# Patient Record
Sex: MALE | Race: WHITE | NOT HISPANIC OR LATINO | Employment: FULL TIME | URBAN - METROPOLITAN AREA
[De-identification: names, ages, dates, MRNs, and addresses within clinical notes are randomized per-mention and may not be internally consistent; named-entity substitution may affect disease eponyms.]

---

## 2017-01-25 ENCOUNTER — OFFICE VISIT (OUTPATIENT)
Dept: URGENT CARE | Facility: URGENT CARE | Age: 62
End: 2017-01-25
Payer: COMMERCIAL

## 2017-01-25 VITALS
BODY MASS INDEX: 25.55 KG/M2 | OXYGEN SATURATION: 97 % | WEIGHT: 199.06 LBS | DIASTOLIC BLOOD PRESSURE: 82 MMHG | SYSTOLIC BLOOD PRESSURE: 127 MMHG | HEART RATE: 77 BPM | TEMPERATURE: 96.9 F

## 2017-01-25 DIAGNOSIS — J06.9 VIRAL URI: Primary | ICD-10-CM

## 2017-01-25 PROCEDURE — 99213 OFFICE O/P EST LOW 20 MIN: CPT | Performed by: FAMILY MEDICINE

## 2017-01-25 NOTE — MR AVS SNAPSHOT
"              After Visit Summary   2017    Ronak Malave    MRN: [id number]           Patient Information     Date Of Birth          1955        Visit Information        Provider Department      2017 2:15 PM Mendoza Ashley, DO [facility]        Today's Diagnoses     Viral URI    -  1        Follow-ups after your visit        Who to contact     If you have questions or need follow up information about today's clinic visit or your schedule please contact [facility] directly at [phone].  Normal or non-critical lab and imaging results will be communicated to you by [employer], letter or phone within 4 business days after the clinic has received the results. If you do not hear from us within 7 days, please contact the clinic through [employer] or phone. If you have a critical or abnormal lab result, we will notify you by phone as soon as possible.  Submit refill requests through [employer] or call your pharmacy and they will forward the refill request to us. Please allow 3 business days for your refill to be completed.          Additional Information About Your Visit        MyChart Information     [employer] lets you send messages to your doctor, view your test results, renew your prescriptions, schedule appointments and more. To sign up, go to [email or web address] . Click on \"Log in\" on the left side of the screen, which will take you to the Welcome page. Then click on \"Sign up Now\" on the right side of the page.     You will be asked to enter the access code listed below, as well as some personal information. Please follow the directions to create your username and password.     Your access code is: [email or web address]  Expires: 2017  2:38 PM     Your access code will  in 90 days. If you need help or a new code, please call your [place] clinic or [phone].        Care EveryWhere ID     This is your Care EveryWhere ID. This could be used " by other organizations to access your [place] medical records  [id number]        Your Vitals Were     Pulse Temperature Pulse Oximetry             77 96.9  F (36.1  C) (Oral) 97%          Blood Pressure from Last 3 Encounters:   01/25/17 127/82   01/23/14 122/70   01/22/14 116/74    Weight from Last 3 Encounters:   01/25/17 199 lb 1 oz (90.294 kg)   01/22/14 189 lb 9.5 oz (86 kg)   01/22/14 191 lb (86.637 kg)              Today, you had the following     No orders found for display       Primary Care Provider Office Phone # Fax #    Tracey Malcolm [phone]       [facility] [place] [address]  [facility] [address]        Thank you!     Thank you for choosing [facility]  for your care. Our goal is always to provide you with excellent care. Hearing back from our patients is one way we can continue to improve our services. Please take a few minutes to complete the written survey that you may receive in the mail after your visit with us. Thank you!             Your Updated Medication List - Protect others around you: Learn how to safely use, store and throw away your medicines at www.disposemymeds.org.          This list is accurate as of: 1/25/17  2:38 PM.  Always use your most recent med list.                   Brand Name Dispense Instructions for use    aspirin 81 MG tablet      Take 81 mg by mouth daily       FINASTERIDE PO      Take 1 mg by mouth daily       glucosamine-chondroitin 500-400 MG Caps per capsule      Take 1 capsule by mouth daily       spironolactone 100 MG tablet    ALDACTONE     Take 50 mg by mouth daily Per pt: takes 1/2 of 100mg tab/day.       warfarin 5 MG tablet    COUMADIN    30 tablet    Take 1 tablet (5 mg) by mouth daily

## 2017-01-25 NOTE — NURSING NOTE
"Chief Complaint   Patient presents with     Cough     peresistent cough, running stuffy nose and fatigue for over two weeks     Initial /82 mmHg  Pulse 77  Temp(Src) 96.9  F (36.1  C) (Oral)  Wt 199 lb 1 oz (90.294 kg)  SpO2 97% Estimated body mass index is 25.55 kg/(m^2) as calculated from the following:    Height as of 1/22/14: 6' 2\" (1.88 m).    Weight as of this encounter: 199 lb 1 oz (90.294 kg)..  bp completed using cuff size regular  LYNDA Art MA    "

## 2017-06-12 ENCOUNTER — OFFICE VISIT (OUTPATIENT)
Dept: URGENT CARE | Facility: URGENT CARE | Age: 62
End: 2017-06-12
Payer: COMMERCIAL

## 2017-06-12 VITALS
OXYGEN SATURATION: 97 % | BODY MASS INDEX: 24.33 KG/M2 | HEART RATE: 71 BPM | DIASTOLIC BLOOD PRESSURE: 72 MMHG | SYSTOLIC BLOOD PRESSURE: 118 MMHG | WEIGHT: 189.5 LBS

## 2017-06-12 DIAGNOSIS — K62.5 RECTAL BLEEDING: Primary | ICD-10-CM

## 2017-06-12 PROCEDURE — 99213 OFFICE O/P EST LOW 20 MIN: CPT | Performed by: FAMILY MEDICINE

## 2017-06-12 NOTE — PROGRESS NOTES
Chief Complaint   Patient presents with     Rectal Problem     rectal bleeding - no pain - 1st noticed it in the last hour - blood was on the underware      SUBJECTIVE:  Ronak Malave, a 62 year old female scheduled an appointment to discuss the following issues:  Rectal bleeding     Pt is here with symptoms of rectal bleeding which she noticed today an hr back while she was wiping herself up  Pt did have some upset stomach last week and then noticed a swelling in the rear end   It was tender and hard to touch   Pt denies any fever or chills or any change in bowel movements .      No past medical history on file.   Past Surgical History:   Procedure Laterality Date     BREAST SURGERY       ORTHOPEDIC SURGERY          Social History     Social History     Marital status: Single     Spouse name: N/A     Number of children: N/A     Years of education: N/A     Occupational History     Not on file.     Social History Main Topics     Smoking status: Never Smoker     Smokeless tobacco: Never Used     Alcohol use 0.6 oz/week     0 Standard drinks or equivalent, 1 Glasses of wine per week     Drug use: No     Sexual activity: Not Currently     Partners: Female      Comment: transwoman     Other Topics Concern     Not on file     Social History Narrative        Current Outpatient Prescriptions   Medication Sig Dispense Refill     FINASTERIDE PO Take 1 mg by mouth daily       aspirin 81 MG tablet Take 81 mg by mouth daily       spironolactone (ALDACTONE) 100 MG tablet Take 50 mg by mouth daily Per pt: takes 1/2 of 100mg tab/day.       glucosamine-chondroitin 500-400 MG CAPS Take 1 capsule by mouth daily       warfarin (COUMADIN) 5 MG tablet Take 1 tablet (5 mg) by mouth daily (Patient not taking: Reported on 6/12/2017) 30 tablet 0       Health Maintenance   Topic Date Due     TETANUS IMMUNIZATION (SYSTEM ASSIGNED)  03/05/1973     ADVANCE DIRECTIVE PLANNING Q5 YRS  03/05/1973     HEPATITIS C SCREENING  03/05/1973     PAP  SCREENING Q3 YR (SYSTEM ASSIGNED)  03/05/1976     MAMMO SCREEN Q2 YR (SYSTEM ASSIGNED)  03/05/1995     LIPID SCREEN Q5 YR FEMALE (SYSTEM ASSIGNED)  03/05/2000     COLON CANCER SCREEN (SYSTEM ASSIGNED)  03/05/2005     INFLUENZA VACCINE (SYSTEM ASSIGNED)  09/01/2017        ROS:  CONSTITUTIONAL:no fever, no chills or sweats, no excessive fatigue, no significant change in weight  CV: neg   RESP -neg  GI:  Neg   NEURO: neg   MSK - neg   Skin - neg   Pyschiatry-neg     OBJECTIVE:  /72  Pulse 71  Wt 189 lb 8 oz (86 kg)  SpO2 97%  Breastfeeding? No  BMI 24.33 kg/m2      EXAM:  GENERAL APPEARANCE: healthy, alert and no distress  EYES: EOMI,  PERRL  HENT: ear canals and TM's normal and nose and mouth without ulcers or lesions  RESP: lungs clear to auscultation - no rales, rhonchi or wheezes  CV: regular rates and rhythm, normal S1 S2, no S3 or S4 and no murmur, click or rub -  ABDOMEN:  soft, nontender, no HSM or masses and bowel sounds normal  Rectal exam:thrombosed hemorrhoid noted which on touching had associated bleeding with 3-4 tiny clots noted too through an opening   The swelling decreased in size too   PSYCH: mentation appears normal and affect normal/bright        ASSESSMENT/PLAN:  Ronak was seen today for rectal problem.    Diagnoses and all orders for this visit:    Rectal bleeding    thrombosed hemorrhoid was cleaned off clots   With some bleeding   Pt tolerated procedure well   Discussed in details about diet involving high fibre         Follow up if  symptoms fail to improve or worsens   Pt understood and agreed with plan           Milagros Case MD

## 2017-06-12 NOTE — MR AVS SNAPSHOT
"              After Visit Summary   2017    Ronak Malave    MRN: [id number]           Patient Information     Date Of Birth          1955        Visit Information        Provider Department      2017 5:30 PM Milagros Case MD [facility]        Today's Diagnoses     Rectal bleeding    -  1       Follow-ups after your visit        Who to contact     If you have questions or need follow up information about today's clinic visit or your schedule please contact [facility] directly at [phone].  Normal or non-critical lab and imaging results will be communicated to you by [employer], letter or phone within 4 business days after the clinic has received the results. If you do not hear from us within 7 days, please contact the clinic through [employer] or phone. If you have a critical or abnormal lab result, we will notify you by phone as soon as possible.  Submit refill requests through [employer] or call your pharmacy and they will forward the refill request to us. Please allow 3 business days for your refill to be completed.          Additional Information About Your Visit        MyChart Information     [employer] lets you send messages to your doctor, view your test results, renew your prescriptions, schedule appointments and more. To sign up, go to [email or web address] . Click on \"Log in\" on the left side of the screen, which will take you to the Welcome page. Then click on \"Sign up Now\" on the right side of the page.     You will be asked to enter the access code listed below, as well as some personal information. Please follow the directions to create your username and password.     Your access code is: [email or web address]  Expires: 9/10/2017  6:17 PM     Your access code will  in 90 days. If you need help or a new code, please call your [place] clinic or [phone].        Care EveryWhere ID     This is your Care EveryWhere ID. This could be " used by other organizations to access your [place] medical records  [id number]        Your Vitals Were     Pulse Pulse Oximetry Breastfeeding? BMI (Body Mass Index)          71 97% No 24.33 kg/m2         Blood Pressure from Last 3 Encounters:   06/12/17 118/72   01/25/17 127/82   01/23/14 122/70    Weight from Last 3 Encounters:   06/12/17 189 lb 8 oz (86 kg)   01/25/17 199 lb 1 oz (90.3 kg)   01/22/14 189 lb 9.5 oz (86 kg)              Today, you had the following     No orders found for display       Primary Care Provider Office Phone # Fax #    Tracey Malcolm [phone]       [facility] [place] [address]  [facility] [address]        Thank you!     Thank you for choosing [facility]  for your care. Our goal is always to provide you with excellent care. Hearing back from our patients is one way we can continue to improve our services. Please take a few minutes to complete the written survey that you may receive in the mail after your visit with us. Thank you!             Your Updated Medication List - Protect others around you: Learn how to safely use, store and throw away your medicines at www.disposemymeds.org.          This list is accurate as of: 6/12/17  6:17 PM.  Always use your most recent med list.                   Brand Name Dispense Instructions for use    aspirin 81 MG tablet      Take 81 mg by mouth daily       FINASTERIDE PO      Take 1 mg by mouth daily       glucosamine-chondroitin 500-400 MG Caps per capsule      Take 1 capsule by mouth daily       spironolactone 100 MG tablet    ALDACTONE     Take 50 mg by mouth daily Per pt: takes 1/2 of 100mg tab/day.       warfarin 5 MG tablet    COUMADIN    30 tablet    Take 1 tablet (5 mg) by mouth daily

## 2017-06-12 NOTE — NURSING NOTE
"Chief Complaint   Patient presents with     Rectal Problem     rectal bleeding - no pain - 1st noticed it in the last hour - blood was on the underware    Ha not had anal sex in 8 years.     Initial /72  Pulse 71  Wt 86 kg (189 lb 8 oz)  SpO2 97%  Breastfeeding? No  BMI 24.33 kg/m2 Estimated body mass index is 24.33 kg/(m^2) as calculated from the following:    Height as of 1/22/14: 1.88 m (6' 2\").    Weight as of this encounter: 86 kg (189 lb 8 oz).  Medication Reconciliation: complete    "

## 2018-03-17 ENCOUNTER — HEALTH MAINTENANCE LETTER (OUTPATIENT)
Age: 63
End: 2018-03-17

## 2018-07-16 ENCOUNTER — APPOINTMENT (OUTPATIENT)
Dept: CARDIOLOGY | Facility: CLINIC | Age: 63
DRG: 282 | End: 2018-07-16
Attending: EMERGENCY MEDICINE

## 2018-07-16 ENCOUNTER — APPOINTMENT (OUTPATIENT)
Dept: CARDIOLOGY | Facility: CLINIC | Age: 63
DRG: 282 | End: 2018-07-16
Attending: STUDENT IN AN ORGANIZED HEALTH CARE EDUCATION/TRAINING PROGRAM

## 2018-07-16 ENCOUNTER — HOSPITAL ENCOUNTER (INPATIENT)
Facility: CLINIC | Age: 63
LOS: 2 days | Discharge: HOME | DRG: 282 | End: 2018-07-18
Attending: EMERGENCY MEDICINE | Admitting: INTERNAL MEDICINE

## 2018-07-16 DIAGNOSIS — I21.29 ST ELEVATION MYOCARDIAL INFARCTION (STEMI) INVOLVING OTHER CORONARY ARTERY (H): ICD-10-CM

## 2018-07-16 DIAGNOSIS — D58.2 ELEVATED HEMOGLOBIN (H): ICD-10-CM

## 2018-07-16 DIAGNOSIS — E78.5 HYPERLIPIDEMIA LDL GOAL <70: Primary | ICD-10-CM

## 2018-07-16 DIAGNOSIS — I21.3 STEMI (ST ELEVATION MYOCARDIAL INFARCTION) (H): ICD-10-CM

## 2018-07-16 LAB
ANION GAP SERPL CALCULATED.3IONS-SCNC: 10 MMOL/L (ref 3–14)
APTT PPP: 24 SEC (ref 22–37)
BASOPHILS # BLD AUTO: 0 10E9/L (ref 0–0.2)
BASOPHILS NFR BLD AUTO: 0.6 %
BUN SERPL-MCNC: 16 MG/DL (ref 7–30)
CALCIUM SERPL-MCNC: 8.5 MG/DL (ref 8.5–10.1)
CHLORIDE SERPL-SCNC: 104 MMOL/L (ref 94–109)
CO2 SERPL-SCNC: 25 MMOL/L (ref 20–32)
CREAT SERPL-MCNC: 0.96 MG/DL (ref 0.52–1.04)
DIFFERENTIAL METHOD BLD: ABNORMAL
EOSINOPHIL # BLD AUTO: 0.2 10E9/L (ref 0–0.7)
EOSINOPHIL NFR BLD AUTO: 3.1 %
ERYTHROCYTE [DISTWIDTH] IN BLOOD BY AUTOMATED COUNT: 13.7 % (ref 10–15)
GFR SERPL CREATININE-BSD FRML MDRD: 59 ML/MIN/1.7M2
GLUCOSE SERPL-MCNC: 118 MG/DL (ref 70–99)
HCT VFR BLD AUTO: 49.5 % (ref 35–47)
HGB BLD-MCNC: 16.8 G/DL (ref 11.7–15.7)
IMM GRANULOCYTES # BLD: 0 10E9/L (ref 0–0.4)
IMM GRANULOCYTES NFR BLD: 0.4 %
INR PPP: 0.88 (ref 0.86–1.14)
KCT BLD-ACNC: 188 SEC (ref 75–150)
LYMPHOCYTES # BLD AUTO: 2.3 10E9/L (ref 0.8–5.3)
LYMPHOCYTES NFR BLD AUTO: 31.7 %
MCH RBC QN AUTO: 30.2 PG (ref 26.5–33)
MCHC RBC AUTO-ENTMCNC: 33.9 G/DL (ref 31.5–36.5)
MCV RBC AUTO: 89 FL (ref 78–100)
MONOCYTES # BLD AUTO: 0.6 10E9/L (ref 0–1.3)
MONOCYTES NFR BLD AUTO: 8 %
NEUTROPHILS # BLD AUTO: 4 10E9/L (ref 1.6–8.3)
NEUTROPHILS NFR BLD AUTO: 56.2 %
NRBC # BLD AUTO: 0 10*3/UL
NRBC BLD AUTO-RTO: 0 /100
PLATELET # BLD AUTO: 271 10E9/L (ref 150–450)
POTASSIUM SERPL-SCNC: 3.4 MMOL/L (ref 3.4–5.3)
RBC # BLD AUTO: 5.56 10E12/L (ref 3.8–5.2)
SODIUM SERPL-SCNC: 139 MMOL/L (ref 133–144)
TROPONIN I SERPL-MCNC: 0.21 UG/L (ref 0–0.04)
WBC # BLD AUTO: 7.1 10E9/L (ref 4–11)

## 2018-07-16 PROCEDURE — 27210759 ZZH DEVICE INFLATION CR6

## 2018-07-16 PROCEDURE — 25000132 ZZH RX MED GY IP 250 OP 250 PS 637: Performed by: STUDENT IN AN ORGANIZED HEALTH CARE EDUCATION/TRAINING PROGRAM

## 2018-07-16 PROCEDURE — 4A023N7 MEASUREMENT OF CARDIAC SAMPLING AND PRESSURE, LEFT HEART, PERCUTANEOUS APPROACH: ICD-10-PCS | Performed by: INTERNAL MEDICINE

## 2018-07-16 PROCEDURE — 85730 THROMBOPLASTIN TIME PARTIAL: CPT | Performed by: EMERGENCY MEDICINE

## 2018-07-16 PROCEDURE — 25000128 H RX IP 250 OP 636: Performed by: STUDENT IN AN ORGANIZED HEALTH CARE EDUCATION/TRAINING PROGRAM

## 2018-07-16 PROCEDURE — C1769 GUIDE WIRE: HCPCS

## 2018-07-16 PROCEDURE — 27211089 ZZH KIT ACIST INJECTOR CR3

## 2018-07-16 PROCEDURE — 25000125 ZZHC RX 250: Performed by: STUDENT IN AN ORGANIZED HEALTH CARE EDUCATION/TRAINING PROGRAM

## 2018-07-16 PROCEDURE — 21000001 ZZH R&B HEART CARE

## 2018-07-16 PROCEDURE — 93458 L HRT ARTERY/VENTRICLE ANGIO: CPT | Mod: 26 | Performed by: INTERNAL MEDICINE

## 2018-07-16 PROCEDURE — 27210795 ZZH PAD DEFIB QUICK CR4

## 2018-07-16 PROCEDURE — 85347 COAGULATION TIME ACTIVATED: CPT

## 2018-07-16 PROCEDURE — 85610 PROTHROMBIN TIME: CPT | Performed by: EMERGENCY MEDICINE

## 2018-07-16 PROCEDURE — 99232 SBSQ HOSP IP/OBS MODERATE 35: CPT | Performed by: INTERNAL MEDICINE

## 2018-07-16 PROCEDURE — 93005 ELECTROCARDIOGRAM TRACING: CPT

## 2018-07-16 PROCEDURE — 99152 MOD SED SAME PHYS/QHP 5/>YRS: CPT | Mod: GC | Performed by: INTERNAL MEDICINE

## 2018-07-16 PROCEDURE — 80048 BASIC METABOLIC PNL TOTAL CA: CPT | Performed by: EMERGENCY MEDICINE

## 2018-07-16 PROCEDURE — 27210787 ZZH MANIFOLD CR2

## 2018-07-16 PROCEDURE — 27210914 ZZH SHEATH CR8

## 2018-07-16 PROCEDURE — 93306 TTE W/DOPPLER COMPLETE: CPT | Mod: 26 | Performed by: INTERNAL MEDICINE

## 2018-07-16 PROCEDURE — 27210856 ZZH ACCESS HEART CATH CR2

## 2018-07-16 PROCEDURE — 85025 COMPLETE CBC W/AUTO DIFF WBC: CPT | Performed by: EMERGENCY MEDICINE

## 2018-07-16 PROCEDURE — C1887 CATHETER, GUIDING: HCPCS

## 2018-07-16 PROCEDURE — 27210946 ZZH KIT HC TOTES DISP CR8

## 2018-07-16 PROCEDURE — 93306 TTE W/DOPPLER COMPLETE: CPT

## 2018-07-16 PROCEDURE — 25000132 ZZH RX MED GY IP 250 OP 250 PS 637: Performed by: INTERNAL MEDICINE

## 2018-07-16 PROCEDURE — 25000132 ZZH RX MED GY IP 250 OP 250 PS 637: Performed by: EMERGENCY MEDICINE

## 2018-07-16 PROCEDURE — 99285 EMERGENCY DEPT VISIT HI MDM: CPT

## 2018-07-16 PROCEDURE — B2111ZZ FLUOROSCOPY OF MULTIPLE CORONARY ARTERIES USING LOW OSMOLAR CONTRAST: ICD-10-PCS | Performed by: INTERNAL MEDICINE

## 2018-07-16 PROCEDURE — 84484 ASSAY OF TROPONIN QUANT: CPT | Performed by: EMERGENCY MEDICINE

## 2018-07-16 PROCEDURE — 27210998 ZZH ACCESS HEART CATH CR6

## 2018-07-16 RX ORDER — HYDRALAZINE HYDROCHLORIDE 20 MG/ML
10-20 INJECTION INTRAMUSCULAR; INTRAVENOUS
Status: DISCONTINUED | OUTPATIENT
Start: 2018-07-16 | End: 2018-07-16 | Stop reason: HOSPADM

## 2018-07-16 RX ORDER — DIPHENHYDRAMINE HYDROCHLORIDE 50 MG/ML
25-50 INJECTION INTRAMUSCULAR; INTRAVENOUS
Status: DISCONTINUED | OUTPATIENT
Start: 2018-07-16 | End: 2018-07-16 | Stop reason: HOSPADM

## 2018-07-16 RX ORDER — METOPROLOL TARTRATE 25 MG/1
25 TABLET, FILM COATED ORAL 2 TIMES DAILY
Status: DISCONTINUED | OUTPATIENT
Start: 2018-07-16 | End: 2018-07-17

## 2018-07-16 RX ORDER — METHYLPREDNISOLONE SODIUM SUCCINATE 125 MG/2ML
125 INJECTION, POWDER, LYOPHILIZED, FOR SOLUTION INTRAMUSCULAR; INTRAVENOUS
Status: DISCONTINUED | OUTPATIENT
Start: 2018-07-16 | End: 2018-07-16 | Stop reason: HOSPADM

## 2018-07-16 RX ORDER — NITROGLYCERIN 5 MG/ML
100-200 VIAL (ML) INTRAVENOUS
Status: DISCONTINUED | OUTPATIENT
Start: 2018-07-16 | End: 2018-07-16 | Stop reason: HOSPADM

## 2018-07-16 RX ORDER — LIDOCAINE 40 MG/G
CREAM TOPICAL
Status: DISCONTINUED | OUTPATIENT
Start: 2018-07-16 | End: 2018-07-18 | Stop reason: HOSPADM

## 2018-07-16 RX ORDER — NITROGLYCERIN 0.4 MG/1
0.4 TABLET SUBLINGUAL EVERY 5 MIN PRN
Status: DISCONTINUED | OUTPATIENT
Start: 2018-07-16 | End: 2018-07-16 | Stop reason: HOSPADM

## 2018-07-16 RX ORDER — ENALAPRILAT 1.25 MG/ML
1.25-2.5 INJECTION INTRAVENOUS
Status: DISCONTINUED | OUTPATIENT
Start: 2018-07-16 | End: 2018-07-16 | Stop reason: HOSPADM

## 2018-07-16 RX ORDER — NIFEDIPINE 10 MG/1
10 CAPSULE ORAL
Status: DISCONTINUED | OUTPATIENT
Start: 2018-07-16 | End: 2018-07-16 | Stop reason: HOSPADM

## 2018-07-16 RX ORDER — HYDROCODONE BITARTRATE AND ACETAMINOPHEN 5; 325 MG/1; MG/1
1-2 TABLET ORAL EVERY 4 HOURS PRN
Status: DISCONTINUED | OUTPATIENT
Start: 2018-07-16 | End: 2018-07-18 | Stop reason: HOSPADM

## 2018-07-16 RX ORDER — LORAZEPAM 2 MG/ML
.5-2 INJECTION INTRAMUSCULAR EVERY 4 HOURS PRN
Status: DISCONTINUED | OUTPATIENT
Start: 2018-07-16 | End: 2018-07-16 | Stop reason: HOSPADM

## 2018-07-16 RX ORDER — ASPIRIN 81 MG/1
81 TABLET ORAL DAILY
Status: DISCONTINUED | OUTPATIENT
Start: 2018-07-17 | End: 2018-07-18 | Stop reason: HOSPADM

## 2018-07-16 RX ORDER — MORPHINE SULFATE 2 MG/ML
1-2 INJECTION, SOLUTION INTRAMUSCULAR; INTRAVENOUS EVERY 5 MIN PRN
Status: DISCONTINUED | OUTPATIENT
Start: 2018-07-16 | End: 2018-07-16 | Stop reason: HOSPADM

## 2018-07-16 RX ORDER — SODIUM NITROPRUSSIDE 25 MG/ML
100-200 INJECTION INTRAVENOUS
Status: DISCONTINUED | OUTPATIENT
Start: 2018-07-16 | End: 2018-07-16 | Stop reason: HOSPADM

## 2018-07-16 RX ORDER — ADENOSINE 3 MG/ML
12-12000 INJECTION, SOLUTION INTRAVENOUS
Status: DISCONTINUED | OUTPATIENT
Start: 2018-07-16 | End: 2018-07-16 | Stop reason: HOSPADM

## 2018-07-16 RX ORDER — FLUMAZENIL 0.1 MG/ML
0.2 INJECTION, SOLUTION INTRAVENOUS
Status: DISCONTINUED | OUTPATIENT
Start: 2018-07-16 | End: 2018-07-16 | Stop reason: HOSPADM

## 2018-07-16 RX ORDER — NICARDIPINE HYDROCHLORIDE 2.5 MG/ML
100 INJECTION INTRAVENOUS
Status: DISCONTINUED | OUTPATIENT
Start: 2018-07-16 | End: 2018-07-16 | Stop reason: HOSPADM

## 2018-07-16 RX ORDER — FLUMAZENIL 0.1 MG/ML
0.2 INJECTION, SOLUTION INTRAVENOUS
Status: ACTIVE | OUTPATIENT
Start: 2018-07-16 | End: 2018-07-16

## 2018-07-16 RX ORDER — POTASSIUM CHLORIDE 7.45 MG/ML
10 INJECTION INTRAVENOUS
Status: DISCONTINUED | OUTPATIENT
Start: 2018-07-16 | End: 2018-07-16 | Stop reason: HOSPADM

## 2018-07-16 RX ORDER — CLOPIDOGREL BISULFATE 75 MG/1
75 TABLET ORAL
Status: DISCONTINUED | OUTPATIENT
Start: 2018-07-16 | End: 2018-07-16 | Stop reason: HOSPADM

## 2018-07-16 RX ORDER — ASPIRIN 81 MG/1
324 TABLET, CHEWABLE ORAL ONCE
Status: COMPLETED | OUTPATIENT
Start: 2018-07-16 | End: 2018-07-16

## 2018-07-16 RX ORDER — PROTAMINE SULFATE 10 MG/ML
1-5 INJECTION, SOLUTION INTRAVENOUS
Status: DISCONTINUED | OUTPATIENT
Start: 2018-07-16 | End: 2018-07-16 | Stop reason: HOSPADM

## 2018-07-16 RX ORDER — EPINEPHRINE 1 MG/ML
0.3 INJECTION, SOLUTION, CONCENTRATE INTRAVENOUS
Status: DISCONTINUED | OUTPATIENT
Start: 2018-07-16 | End: 2018-07-16 | Stop reason: HOSPADM

## 2018-07-16 RX ORDER — LIDOCAINE HYDROCHLORIDE 10 MG/ML
30 INJECTION, SOLUTION EPIDURAL; INFILTRATION; INTRACAUDAL; PERINEURAL
Status: DISCONTINUED | OUTPATIENT
Start: 2018-07-16 | End: 2018-07-16 | Stop reason: HOSPADM

## 2018-07-16 RX ORDER — VERAPAMIL HYDROCHLORIDE 2.5 MG/ML
1-2.5 INJECTION, SOLUTION INTRAVENOUS
Status: COMPLETED | OUTPATIENT
Start: 2018-07-16 | End: 2018-07-16

## 2018-07-16 RX ORDER — NALOXONE HYDROCHLORIDE 0.4 MG/ML
.2-.4 INJECTION, SOLUTION INTRAMUSCULAR; INTRAVENOUS; SUBCUTANEOUS
Status: ACTIVE | OUTPATIENT
Start: 2018-07-16 | End: 2018-07-16

## 2018-07-16 RX ORDER — ACETAMINOPHEN 325 MG/1
325-650 TABLET ORAL EVERY 4 HOURS PRN
Status: DISCONTINUED | OUTPATIENT
Start: 2018-07-16 | End: 2018-07-18 | Stop reason: HOSPADM

## 2018-07-16 RX ORDER — NALOXONE HYDROCHLORIDE 0.4 MG/ML
.1-.4 INJECTION, SOLUTION INTRAMUSCULAR; INTRAVENOUS; SUBCUTANEOUS
Status: DISCONTINUED | OUTPATIENT
Start: 2018-07-16 | End: 2018-07-18 | Stop reason: HOSPADM

## 2018-07-16 RX ORDER — ATROPINE SULFATE 0.1 MG/ML
.5-1 INJECTION INTRAVENOUS
Status: DISCONTINUED | OUTPATIENT
Start: 2018-07-16 | End: 2018-07-16 | Stop reason: HOSPADM

## 2018-07-16 RX ORDER — ATORVASTATIN CALCIUM 80 MG/1
80 TABLET, FILM COATED ORAL EVERY EVENING
Status: DISCONTINUED | OUTPATIENT
Start: 2018-07-16 | End: 2018-07-18 | Stop reason: HOSPADM

## 2018-07-16 RX ORDER — PRASUGREL 10 MG/1
10-60 TABLET, FILM COATED ORAL
Status: DISCONTINUED | OUTPATIENT
Start: 2018-07-16 | End: 2018-07-16 | Stop reason: HOSPADM

## 2018-07-16 RX ORDER — METOPROLOL TARTRATE 1 MG/ML
5 INJECTION, SOLUTION INTRAVENOUS EVERY 5 MIN PRN
Status: DISCONTINUED | OUTPATIENT
Start: 2018-07-16 | End: 2018-07-16 | Stop reason: HOSPADM

## 2018-07-16 RX ORDER — NALOXONE HYDROCHLORIDE 0.4 MG/ML
0.4 INJECTION, SOLUTION INTRAMUSCULAR; INTRAVENOUS; SUBCUTANEOUS EVERY 5 MIN PRN
Status: DISCONTINUED | OUTPATIENT
Start: 2018-07-16 | End: 2018-07-16 | Stop reason: HOSPADM

## 2018-07-16 RX ORDER — IOPAMIDOL 755 MG/ML
150 INJECTION, SOLUTION INTRAVASCULAR ONCE
Status: COMPLETED | OUTPATIENT
Start: 2018-07-16 | End: 2018-07-16

## 2018-07-16 RX ORDER — ONDANSETRON 2 MG/ML
4 INJECTION INTRAMUSCULAR; INTRAVENOUS EVERY 4 HOURS PRN
Status: DISCONTINUED | OUTPATIENT
Start: 2018-07-16 | End: 2018-07-16 | Stop reason: HOSPADM

## 2018-07-16 RX ORDER — BUPIVACAINE HYDROCHLORIDE 2.5 MG/ML
1-10 INJECTION, SOLUTION EPIDURAL; INFILTRATION; INTRACAUDAL
Status: DISCONTINUED | OUTPATIENT
Start: 2018-07-16 | End: 2018-07-16 | Stop reason: HOSPADM

## 2018-07-16 RX ORDER — CLOPIDOGREL BISULFATE 75 MG/1
75 TABLET ORAL DAILY
Status: DISCONTINUED | OUTPATIENT
Start: 2018-07-17 | End: 2018-07-18 | Stop reason: HOSPADM

## 2018-07-16 RX ORDER — POTASSIUM CHLORIDE 29.8 MG/ML
20 INJECTION INTRAVENOUS
Status: DISCONTINUED | OUTPATIENT
Start: 2018-07-16 | End: 2018-07-16 | Stop reason: HOSPADM

## 2018-07-16 RX ORDER — DEXTROSE MONOHYDRATE 25 G/50ML
12.5-5 INJECTION, SOLUTION INTRAVENOUS EVERY 30 MIN PRN
Status: DISCONTINUED | OUTPATIENT
Start: 2018-07-16 | End: 2018-07-16 | Stop reason: HOSPADM

## 2018-07-16 RX ORDER — NITROGLYCERIN 5 MG/ML
100-500 VIAL (ML) INTRAVENOUS
Status: COMPLETED | OUTPATIENT
Start: 2018-07-16 | End: 2018-07-16

## 2018-07-16 RX ORDER — LISINOPRIL 2.5 MG/1
2.5 TABLET ORAL DAILY
Status: DISCONTINUED | OUTPATIENT
Start: 2018-07-16 | End: 2018-07-18

## 2018-07-16 RX ORDER — CLOPIDOGREL 300 MG/1
300-600 TABLET, FILM COATED ORAL
Status: DISCONTINUED | OUTPATIENT
Start: 2018-07-16 | End: 2018-07-16 | Stop reason: HOSPADM

## 2018-07-16 RX ORDER — PROTAMINE SULFATE 10 MG/ML
25-100 INJECTION, SOLUTION INTRAVENOUS EVERY 5 MIN PRN
Status: DISCONTINUED | OUTPATIENT
Start: 2018-07-16 | End: 2018-07-16 | Stop reason: HOSPADM

## 2018-07-16 RX ORDER — ASPIRIN 81 MG/1
81-324 TABLET, CHEWABLE ORAL
Status: DISCONTINUED | OUTPATIENT
Start: 2018-07-16 | End: 2018-07-16 | Stop reason: HOSPADM

## 2018-07-16 RX ORDER — PHENYLEPHRINE HCL IN 0.9% NACL 1 MG/10 ML
20-100 SYRINGE (ML) INTRAVENOUS
Status: DISCONTINUED | OUTPATIENT
Start: 2018-07-16 | End: 2018-07-16 | Stop reason: HOSPADM

## 2018-07-16 RX ORDER — FENTANYL CITRATE 50 UG/ML
25-50 INJECTION, SOLUTION INTRAMUSCULAR; INTRAVENOUS
Status: DISCONTINUED | OUTPATIENT
Start: 2018-07-16 | End: 2018-07-16 | Stop reason: HOSPADM

## 2018-07-16 RX ORDER — HEPARIN SODIUM 1000 [USP'U]/ML
1000-10000 INJECTION, SOLUTION INTRAVENOUS; SUBCUTANEOUS EVERY 5 MIN PRN
Status: DISCONTINUED | OUTPATIENT
Start: 2018-07-16 | End: 2018-07-16 | Stop reason: HOSPADM

## 2018-07-16 RX ORDER — FENTANYL CITRATE 50 UG/ML
25-50 INJECTION, SOLUTION INTRAMUSCULAR; INTRAVENOUS
Status: ACTIVE | OUTPATIENT
Start: 2018-07-16 | End: 2018-07-16

## 2018-07-16 RX ORDER — LIDOCAINE HYDROCHLORIDE 10 MG/ML
1-10 INJECTION, SOLUTION EPIDURAL; INFILTRATION; INTRACAUDAL; PERINEURAL
Status: COMPLETED | OUTPATIENT
Start: 2018-07-16 | End: 2018-07-16

## 2018-07-16 RX ORDER — ATROPINE SULFATE 0.1 MG/ML
0.5 INJECTION INTRAVENOUS EVERY 5 MIN PRN
Status: ACTIVE | OUTPATIENT
Start: 2018-07-16 | End: 2018-07-16

## 2018-07-16 RX ORDER — ASPIRIN 325 MG
325 TABLET ORAL
Status: DISCONTINUED | OUTPATIENT
Start: 2018-07-16 | End: 2018-07-16 | Stop reason: HOSPADM

## 2018-07-16 RX ORDER — FUROSEMIDE 10 MG/ML
20-100 INJECTION INTRAMUSCULAR; INTRAVENOUS
Status: DISCONTINUED | OUTPATIENT
Start: 2018-07-16 | End: 2018-07-16 | Stop reason: HOSPADM

## 2018-07-16 RX ORDER — SODIUM CHLORIDE 9 MG/ML
INJECTION, SOLUTION INTRAVENOUS CONTINUOUS
Status: DISCONTINUED | OUTPATIENT
Start: 2018-07-16 | End: 2018-07-18 | Stop reason: HOSPADM

## 2018-07-16 RX ADMIN — MIDAZOLAM 1 MG: 1 INJECTION INTRAMUSCULAR; INTRAVENOUS at 09:05

## 2018-07-16 RX ADMIN — FENTANYL CITRATE 50 MCG: 50 INJECTION, SOLUTION INTRAMUSCULAR; INTRAVENOUS at 09:29

## 2018-07-16 RX ADMIN — METOPROLOL TARTRATE 25 MG: 25 TABLET ORAL at 12:02

## 2018-07-16 RX ADMIN — NITROGLYCERIN 300 MCG: 5 INJECTION, SOLUTION INTRAVENOUS at 09:38

## 2018-07-16 RX ADMIN — ASPIRIN 81 MG 324 MG: 81 TABLET ORAL at 11:51

## 2018-07-16 RX ADMIN — HEPARIN SODIUM 7000 UNITS: 1000 INJECTION, SOLUTION INTRAVENOUS; SUBCUTANEOUS at 09:08

## 2018-07-16 RX ADMIN — IOPAMIDOL 150 ML: 755 INJECTION, SOLUTION INTRAVASCULAR at 10:00

## 2018-07-16 RX ADMIN — VERAPAMIL HYDROCHLORIDE 2.5 MG: 2.5 INJECTION, SOLUTION INTRAVENOUS at 09:09

## 2018-07-16 RX ADMIN — ATORVASTATIN CALCIUM 80 MG: 80 TABLET, FILM COATED ORAL at 20:14

## 2018-07-16 RX ADMIN — MIDAZOLAM 1 MG: 1 INJECTION INTRAMUSCULAR; INTRAVENOUS at 09:09

## 2018-07-16 RX ADMIN — METOPROLOL TARTRATE 25 MG: 25 TABLET ORAL at 20:14

## 2018-07-16 RX ADMIN — NITROGLYCERIN 200 MCG: 5 INJECTION, SOLUTION INTRAVENOUS at 09:09

## 2018-07-16 RX ADMIN — MIDAZOLAM 1 MG: 1 INJECTION INTRAMUSCULAR; INTRAVENOUS at 09:28

## 2018-07-16 RX ADMIN — LIDOCAINE HYDROCHLORIDE 1 ML: 10 INJECTION, SOLUTION EPIDURAL; INFILTRATION; INTRACAUDAL; PERINEURAL at 09:06

## 2018-07-16 RX ADMIN — HEPARIN SODIUM 4000 UNITS: 1000 INJECTION, SOLUTION INTRAVENOUS; SUBCUTANEOUS at 09:34

## 2018-07-16 RX ADMIN — FENTANYL CITRATE 50 MCG: 50 INJECTION, SOLUTION INTRAMUSCULAR; INTRAVENOUS at 09:05

## 2018-07-16 RX ADMIN — SODIUM CHLORIDE: 9 INJECTION, SOLUTION INTRAVENOUS at 10:45

## 2018-07-16 RX ADMIN — TICAGRELOR 180 MG: 90 TABLET ORAL at 08:50

## 2018-07-16 RX ADMIN — LISINOPRIL 2.5 MG: 2.5 TABLET ORAL at 16:59

## 2018-07-16 RX ADMIN — FENTANYL CITRATE 50 MCG: 50 INJECTION, SOLUTION INTRAMUSCULAR; INTRAVENOUS at 09:09

## 2018-07-16 ASSESSMENT — ENCOUNTER SYMPTOMS
CHEST TIGHTNESS: 1
DIAPHORESIS: 1
PALPITATIONS: 1
DIZZINESS: 1

## 2018-07-16 NOTE — IP AVS SNAPSHOT
[facility]    [address] [place], [place] [address]., [address]    [place] [address]    Phone:  [phone]                                       After Visit Summary   7/16/2018    Ronak Malave    MRN: [id number]           After Visit Summary Signature Page     I have received my discharge instructions, and my questions have been answered. I have discussed any challenges I see with this plan with the nurse or doctor.    ..........................................................................................................................................  Patient/Patient Representative Signature      ..........................................................................................................................................  Patient Representative Print Name and Relationship to Patient    ..................................................               ................................................  Date                                            Time    ..........................................................................................................................................  Reviewed by Signature/Title    ...................................................              ..............................................  Date                                                            Time

## 2018-07-16 NOTE — H&P
[address] [address]  p: [phone]    History and Physical: Cardiology Service    Ronak Malave MRN# [id number]   YOB: 1955 Age: 63 year old       Admission Date: 7/16/2018    Chief Complaint: chest pain    HPI: 63-year-old transgender female-to-male with history of PE and no cardiac risk factors presenting with acute onset of chest pain. She developed substernal pain radiating down left arm beginning at approx 0730 while getting ready for work. She called EMS and, upon arrival, ECG revealed inferior ST-elevations. She was given nitroglycerin and ASA and reported minimal pain upon arrival to the ER. She denies any history of similar symptoms in the past. She is currently on hormone therapy with finasteride and spironolactone and denies other drug use. She has a history of PE 5 years ago and reports being on warfarin for only a short time because her PE was attributed to her estradiol use.    Past Medical History  Pulmonary embolism  Hormone treatment for gender reversal    Past Surgical History:   Procedure Laterality Date     BREAST SURGERY       ORTHOPEDIC SURGERY           No current facility-administered medications on file prior to encounter.   Current Outpatient Prescriptions on File Prior to Encounter:  FINASTERIDE PO Take 1 mg by mouth daily   glucosamine-chondroitin 500-400 MG CAPS Take 1 capsule by mouth daily   spironolactone (ALDACTONE) 100 MG tablet Take 50 mg by mouth daily Per pt: takes 1/2 of 100mg tab/day.       No family history on file.    Social History   Substance Use Topics     Smoking status: Never Smoker     Smokeless tobacco: Never Used     Alcohol use 0.6 oz/week     0 Standard drinks or equivalent, 1 Glasses of wine per week       No Known Allergies      ROS:   CONSTITUTIONAL:No report of fevers or chills  RESPIRATORY: No cough, wheezing, SOB, or hemoptysis  CARDIOVASCULAR: see HPI  MUSCULO-SKELETAL: No  "joint pain/swelling, no muslce pain  NEURO: No paresthesias, syncope, pre-syncope, light headness, dizziness or vertigo  ENDOCRINE: No temperature intolerance, no skin/hair changes  PSYCHIATRIC: No change in mood, feeling down/anxious, no change in sleep or appetite  GI: no melena or hematochezia, no change in bowel habits  : no hematuria or dysurea, no hesitancy, dribbling or incontinence  HEME: no easy bruising or bleeding, no history of anemia, no history of blood clots  SKIN: no rashes or sores, no unusual itching      Physical Examination:  Vitals: /90  Temp 97.1  F (36.2  C) (Temporal)  Resp 18  Ht 1.88 m (6' 2\")  Wt 86.5 kg (190 lb 12.8 oz)  SpO2 98%  BMI 24.5 kg/m2  BMI= Body mass index is 24.5 kg/(m^2).    GENERAL APPEARANCE: healthy, alert and no distress  HEENT: no icterus, no xanthelasmas, normal pupil size and reaction, normal palate, mucosa moist  NECK: no JVD, brisk carotid upstroke bilaterally  CHEST: lungs clear to auscultation without rales, rhonchi or wheezes, no use of accessory muscles, no retractions  CARDIOVASCULAR: regular rhythm, normal S1 and S2, no S3 or S4 and no murmur, click or rub, precordium quiet with normal PMI.  ABDOMEN: soft, non tender, without hepatosplenomegaly, no masses palpable, bowel sounds normal  EXTREMITIES: warm, no edema, DP/PT pulses 2+ bilaterally, no clubbing or cyanosis   NEURO: alert and oriented to person/place/time, normal speech, gait and affect  SKIN: no ecchymoses, no rashes      Laboratory:  CMP  Recent Labs  Lab 07/16/18  0844      POTASSIUM 3.4   CHLORIDE 104   CO2 25   ANIONGAP 10   *   BUN 16   CR 0.96   GFRESTIMATED 59*   GFRESTBLACK 71   YVES 8.5     CBC  Recent Labs  Lab 07/16/18  0844   WBC 7.1   RBC 5.56*   HGB 16.8*   HCT 49.5*   MCV 89   MCH 30.2   MCHC 33.9   RDW 13.7          Lab Results   Component Value Date    TROPI 0.214 (HH) 07/16/2018    TROPI <0.012 01/22/2014         EKG today: NSR, ST-elevations II, III, " aVF      Assessment: 63-year-old transgender female-to-male with history of PE and no cardiac risk factors presenting with acute onset of chest pain and found to have STEMI    1. Coronary artery disease with acute STEMI: Etiology not entirely clear. Coronary angiography revealed a 100% occlusion of a distal OM4 vessel with no other clear culprit. Could explain chest pain ST-elevations though odd for this to develop such pronounced inferior changes (and especially with ST-segments taller in III and II). The OM4 lesion was crossed with a wire which revealed a small-caliber distal vessel with limited territory so the decision was made to not balloon or stent. Patient now chest pain free. Will treat medically and admit to inpatient cardiology service for further workup. Of note, he does have nonobstructive disease in his other vessels.  2. Elevated left ventricular filling pressures: LVEDP 29 mmHg. No clinical evidence of heart failure and patient able to lie flat on table for entire case without dyspnea. Unable to perform left ventriculography due to elevated pressures so will need echo vs CMR per inpatient team.  3. Hormone replacement: Will hold for now.  4. History of pulmonary embolism: Per report it sounds like it was labeled as a provoked event from estradiol therapy. No longer on anticoagulation. Relief of chest pain with nitroglycerin and presence of ST-elevations makes recurrent PE less likely.  5. FEN: regular diet  6.PPx: ambulatory  7. Full code    Plan  -admit to inpatient cardiology service with telemetry  -begin aspirin 81 mg daily indefinitely and clopidogrel 75 mg daily for minimum of 12 months  -begin atorvastatin 80 mg daily  -will hold off on BB for now until patient can be reassessed given significantly elevated filling pressures and concern for CHF  -echocardiogram vs cardiac MRI per inpatient team  -cardiac rehab      Patient seen and discussed with Dr. Marcelo Laureano MD  Cardiovascular  Disease Fellow  [phone]         Physician Supervisory Attestation:   I have reviewed and discussed with Dr. Laureano the history, physical and plan and independently interviewed and examined Ronak Malave and agree with the plan as stated in the note.    Garret Robertson MD

## 2018-07-16 NOTE — IP AVS SNAPSHOT
MRN:[id number]                      After Visit Summary   7/16/2018    Ronak Malave    MRN: [id number]           Thank you!     Thank you for choosing [facility] for your care. Our goal is always to provide you with excellent care. Hearing back from our patients is one way we can continue to improve our services. Please take a few minutes to complete the written survey that you may receive in the mail after you visit with us. Thank you!        Patient Information     Date Of Birth          1955        Designated Caregiver       Most Recent Value    Caregiver    Will someone help with your care after discharge? yes    Name of designated caregiver Hien Bautista    Phone number of caregiver [phone]    Caregiver address [address]      About your hospital stay     You were admitted on:  July 16, 2018 [facility]    You were discharged on:  July 18, 2018        Reason for your hospital stay       You had a heart attack.                  Who to Call     For medical emergencies, please call 911.  For non-urgent questions about your medical care, please call your primary care provider or clinic, [phone]          Attending Provider     Provider Specialty    Silvina Berger MD Emergency Medicine    Marcelo, Garret Patiño MD Cardiology       Primary Care Provider Office Phone # Fax #    [facility] [phone]      After Care Instructions     Activity       Per cardiac rehab handouts            Diet       Follow this diet upon discharge: Orders Placed This Encounter      Low Saturated Fat Na <2400 mg            Discharge Instructions       Primary care followup scheduled :   August 2 at 10:40am  Tina McLean CNP, [facility]  [address] [place] [address]  Phone [phone]                  Follow-up Appointments     Follow-up and recommended labs and  tests        With your primary care doctor in 2 weeks.                  Your next 10 appointments already scheduled     Jul 18, 2018  6:30 PM CDT   IP Cardiac Rehab Satellite Treatment with Ky Caraballo OT   [facility] ([facility])    [address], [address]  [facility] [address]   [phone]            Jul 24, 2018  9:30 AM CDT   LAB with [facility]   [facility] [place] ([facility])    [address]  [facility] [id number] [phone]           Please do not eat 10-12 hours before your appointment if you are coming in fasting for labs on lipids, cholesterol, or glucose (sugar). This does not apply to pregnant women. Water, hot tea and black coffee (with nothing added) are okay. Do not drink other fluids, diet soda or chew gum.            Jul 24, 2018 10:30 AM CDT   Return Visit with Jennifer Mccarthy PA-C   [facility] ([facility])    [address]  [place] [address]   [phone] OPT 2            Jul 26, 2018 10:00 AM CDT   Cardiac Evaluation with  Cardiac Rehab 1   [facility] ([facility])    [address], [address]  [facility] [address] [phone]              Additional Services     Cardiac Rehab Referral       *This therapy referral will be filtered to a centralized scheduling office at [facility] and the patient will receive a call to schedule an appointment at a [facility] location most convenient for them.*     If you have not heard from the scheduling office within 2 business days, please call [phone] for all locations, with the exception of [place], please call [phone].    Please be aware that coverage of these services is subject to the terms and limitations of your health insurance plan.  Call member services at your health plan with any  "benefit or coverage questions.      **Note to Provider:  If you are referring outside of [place] for the therapy appointment, please list the name of the location in the \"special instructions\" above, print the referral and give to the patient to schedule the appointment.                   Follow-Up with Cardiac Advanced Practice Provider                 Future tests that were ordered for you     CBC with platelets       Last Lab Result: Hemoglobin (g/dL)       Date                     Value                 07/16/2018               16.8 (H)         ----------            Basic metabolic panel                 Further instructions from your care team       A follow-up appointment was scheduled for you [see above].  It is very important to go to it--bring these papers and your insurance card with you.  At the visit, talk about your hospital stay.  Tell your doctor how you feel.  Show your new list of medications.  Your doctor will update records, make sure you are still doing OK, and decide if any tests or medication changes are needed.    + cardiology definitely wants you seen at the above appointment  + your insurance may not start until August 1, and may not backdate to July  + Sarbjit (ROSHAN) assisted you with the [facility] Financial Counselor (FC): [phone]      Cardiac Angiogram Discharge Instructions - Radial    After you go home:      Have an adult stay with you until tomorrow.    Drink extra fluids for 2 days.    You may resume your normal diet.    No smoking       For 24 hours - due to the sedation you received:    Relax and take it easy.    Do NOT make any important or legal decisions.    Do NOT drive or operate machines at home or at work.    Do NOT drink alcohol.    Care of Wrist Puncture Site:      For the first 24 hrs - check the puncture site every 1-2 hours while awake.    It is normal to have soreness at the puncture site and mild tingling in your hand for up to 3 " days.    Remove the bandaid after 24 hours. If there is minor oozing, apply another bandaid and remove it after 12 hours.    You may shower tomorrow.  Do NOT take a bath, or use a hot tub or pool for at least 3 days. Do NOT scrub the site. Do not use lotion or powder near the puncture site.           Activity:        For 2 days:     do not use your hand or arm to support your weight (such as rising from a chair)     do not bend your wrist (such as lifting a garage door).    do not lift more than 5 pounds or exercise your arm (such as tennis, golf or bowling).    Do NOT do any heavy activity such as exercise, lifting, or straining.     Bleeding:      If you start bleeding from the site in your wrist, sit down and press firmly on/above the site for 10 minutes.     Once bleeding stops, keep arm still for 2 hours.     Call [facility] as soon as you can.       Call 911 right away if you have heavy bleeding or bleeding that does not stop.      Medicines:      If you are taking an antiplatelet medication such as Plavix, Brilinta or Effient, do not stop taking it until you talk to your cardiologist.        If you are on Metformin (Glucophage), do not restart it until you have blood tests (within 2 to 3 days after discharge).  After you have your blood drawn, you may restart the Metformin.     Take your medications, including blood thinners, unless your provider tells you not to.  If you take Coumadin (Warfarin), have your INR checked by your provider in  3-5 days. Call your clinic to schedule this.    If you have stopped any medicines, check with your provider about when to restart them.    Follow Up Appointments:      Follow up with [facility] Nurse Practitioner at [facility] of patient preference in 7-10 days.    Call the clinic if:      You have a large or growing hard lump around the site.    The site is red, swollen, hot or tender.    Blood or fluid is draining from the site.    You have chills or a fever greater  "than 101 F (38 C).    Your arm feels numb, cool or changes color.    You have hives, a rash or unusual itching.    Any questions or concerns.          [facility] [place]:    [phone] UMP (7 days a week)            Pending Results     No orders found from 2018 to 2018.            Statement of Approval     Ordered          18 1056  I have reviewed and agree with all the recommendations and orders detailed in this document.  EFFECTIVE NOW     Approved and electronically signed by:  Jennifer Mccarthy PA-C             Admission Information     Date & Time Provider Department Dept. Phone    2018 Garret Robertson MD [facility] Cardiac Specialty Care [phone]      Your Vitals Were     Blood Pressure Pulse Temperature Respirations Height Weight    101/64 88 97.7  F (36.5  C) (Oral) 16 1.88 m (6' 2\") 84.1 kg (185 lb 6.4 oz)    Pulse Oximetry BMI (Body Mass Index)                95% 23.8 kg/m2          [employer] Information     [employer] lets you send messages to your doctor, view your test results, renew your prescriptions, schedule appointments and more. To sign up, go to [email or web address] . Click on \"Log in\" on the left side of the screen, which will take you to the Welcome page. Then click on \"Sign up Now\" on the right side of the page.     You will be asked to enter the access code listed below, as well as some personal information. Please follow the directions to create your username and password.     Your access code is: [email or web address]  Expires: 10/16/2018 10:42 AM     Your access code will  in 90 days. If you need help or a new code, please call your [place] clinic or [phone].        Care EveryWhere ID     This is your Care EveryWhere ID. This could be used by other organizations to access your [facility] medical records  [id number]        Equal Access to Services     AWA BUSCH AH: abena Severino, " jeanethjason deweyconstantinokevan huitronnahomysanketkim sharadrichy harpaan ah. So [facility] [phone].    ATENCIÓN: Si eugenio casarez, tiene a kelly disposición servicios gratuitos de asistencia lingüística. Llame al [phone].    We comply with applicable federal civil rights laws and [place] laws. We do not discriminate on the basis of race, color, national origin, age, disability, sex, sexual orientation, or gender identity.               Review of your medicines      START taking        Dose / Directions    aspirin 81 MG EC tablet   Used for:  ST elevation myocardial infarction (STEMI) involving other coronary artery (H)        Dose:  81 mg   Start taking on:  7/19/2018   Take 1 tablet (81 mg) by mouth daily   Quantity:  100 tablet   Refills:  3       atorvastatin 80 MG tablet   Commonly known as:  LIPITOR        Dose:  80 mg   Take 1 tablet (80 mg) by mouth every evening   Quantity:  90 tablet   Refills:  3       carvedilol 3.125 MG tablet   Commonly known as:  COREG   Used for:  ST elevation myocardial infarction (STEMI) involving other coronary artery (H)        Dose:  3.125 mg   Take 1 tablet (3.125 mg) by mouth 2 times daily (with meals)   Quantity:  180 tablet   Refills:  3       clopidogrel 75 MG tablet   Commonly known as:  PLAVIX   Used for:  ST elevation myocardial infarction (STEMI) involving other coronary artery (H)        Dose:  75 mg   Start taking on:  7/19/2018   Take 1 tablet (75 mg) by mouth daily   Quantity:  90 tablet   Refills:  3       nitroGLYcerin 0.4 MG sublingual tablet   Commonly known as:  NITROSTAT   Used for:  ST elevation myocardial infarction (STEMI) involving other coronary artery (H)        For chest pain place 1 tablet under the tongue every 5 minutes for 3 doses. If symptoms persist 5 minutes after 1st dose call 911.   Quantity:  25 tablet   Refills:  3         CONTINUE these medicines which have NOT CHANGED        Dose / Directions    FINASTERIDE PO        Dose:  5 mg   Take 5 mg by  mouth daily   Refills:  0       glucosamine-chondroitin 500-400 MG Caps per capsule        Dose:  1 capsule   Take 1 capsule by mouth daily   Refills:  0       HAIR SKIN AND NAILS FORMULA PO        Dose:  2 capsule   Take 2 capsules by mouth daily   Refills:  0       SPIRONOLACTONE PO        Dose:  50 mg   Take 50 mg by mouth 2 times daily   Refills:  0            Where to get your medicines      These medications were sent to [facility] [place], [place] - [address]  [address], [place] [address]     Phone:  [phone]     aspirin 81 MG EC tablet    atorvastatin 80 MG tablet    carvedilol 3.125 MG tablet    clopidogrel 75 MG tablet    nitroGLYcerin 0.4 MG sublingual tablet                Protect others around you: Learn how to safely use, store and throw away your medicines at www.disposemymeds.org.             Medication List: This is a list of all your medications and when to take them. Check marks below indicate your daily home schedule. Keep this list as a reference.      Medications           Morning Afternoon Evening Bedtime As Needed    aspirin 81 MG EC tablet   Take 1 tablet (81 mg) by mouth daily   Start taking on:  7/19/2018   Last time this was given:  81 mg on 7/18/2018  9:32 AM            Next dose 7/19                       atorvastatin 80 MG tablet   Commonly known as:  LIPITOR   Take 1 tablet (80 mg) by mouth every evening   Last time this was given:  80 mg on 7/17/2018  8:53 PM                    Due tonight 7/18               carvedilol 3.125 MG tablet   Commonly known as:  COREG   Take 1 tablet (3.125 mg) by mouth 2 times daily (with meals)   Last time this was given:  3.125 mg on 7/18/2018  9:32 AM                    Due with dinner 7/18               clopidogrel 75 MG tablet   Commonly known as:  PLAVIX   Take 1 tablet (75 mg) by mouth daily   Start taking on:  7/19/2018   Last time this was given:  75 mg on 7/18/2018  9:32 AM            Next dose 7/19          "              FINASTERIDE PO   Take 5 mg by mouth daily   Last time this was given:  5 mg on 7/18/2018  9:32 AM            7/19                       glucosamine-chondroitin 500-400 MG Caps per capsule   Take 1 capsule by mouth daily            Resume per home schedule                       HAIR SKIN AND NAILS FORMULA PO   Take 2 capsules by mouth daily            Resume per home schedule                       nitroGLYcerin 0.4 MG sublingual tablet   Commonly known as:  NITROSTAT   For chest pain place 1 tablet under the tongue every 5 minutes for 3 doses. If symptoms persist 5 minutes after 1st dose call 911.                                   SPIRONOLACTONE PO   Take 50 mg by mouth 2 times daily   Last time this was given:  50 mg on 7/18/2018  9:32 AM            Next dose 7/19                                 More Information        Medicines for Heart Disease  You will likely take several types of medicine for your heart disease. Some of the medicines reduce the chance of heart attack and stroke. Others control blood pressure and cholesterol. You may also take medicines for other heart problems, such as heart failure or irregular heart rhythm (arrhythmia). And other health conditions such as diabetes likely also need medicines. Keeping track of your medicines and knowing what each does can get confusing.  Medicines for heart disease  Many people with heart disease take the 4 medicines shown in this chart. Other common medicines are listed later. Your doctor or cardiac rehab team can help you look at the types of medicines that have been prescribed for you.     Type of medicine What it does   Statin Lowers the amount of LDL (\"bad') cholesterol and other fats in the blood. This lowers the chance of clogged arteries.  May make your levels of HDL (\"good\") cholesterol better.   ACE inhibitor or angiotensin receptor blocker (ARB)t Lowers blood pressure and eases strain on the heart. This makes it easier for the heart to " pump and improves blood flow.   Aspirin Helps prevent blood clots. Clots could block an artery.  May reduce your risk for a heart attack.   Beta-blocker Lowers blood pressure and slows heart rate.  May strengthen the heart's pumping action over time.      Other medicines you may take      Type of medicine What it does   Antiarrhythmic Helps slow down and regulate a fast or irregular heartbeat (arrhythmia)   Anticoagulant Helps reduce the risk that a blood clot will form and block the artery.   Antihypertensive Helps lower blood pressure.   Calcium channel blocker Helps blood flow more easily through the arteries by widening (dilating) them.   Digoxin Slows heart rate and helps the heart pump more with each beat.   Diuretic Helps your body get rid of extra water. This is important if you have high blood pressure or heart failure.   Nitrate (nitroglycerine) Helps prevent and treat angina.   Vasodilator Helps blood flow more easily through the arteries.   Date Last Reviewed: 4/7/2016 2000-2017 [employer]. [address] [place] [address]. All rights reserved. This information is not intended as a substitute for professional medical care. Always follow your healthcare professional's instructions.              Taking Aspirin Every Day  The basics  Taking aspirin every day can lower your risk of heart attack and stroke. Ask your doctor about taking aspirin if you:    Are a man age 45 or older    Are a woman age 55 or older    Smoke    Have high blood pressure, high cholesterol or diabetes    Have a family history of heart disease    Have already had a heart attack or stroke  For most people, aspirin is safe. But it's not right for everyone. Talk to your doctor before you start taking aspirin every day.  The benefits  Aspirin can reduce your risk of heart attack or stroke. It can:    Improve the flow of blood to the heart and brain    Help keep your arteries open if you have had a stroke or  "angioplasty  If you have already had a heart attack or stroke, daily aspirin can lower your risk of having another one.  Take action!  Your doctor can help you decide if aspirin is the right choice for you. Talk to your doctor about:    Your risk of heart attack    What kind of aspirin to take    How much to take    How often to take it  Be sure to tell your doctor about all the other medicines that you take, including vitamins.   For informational purposes only. Not to replace the advice of your health care provider. From \"Talk with Your Doctor about Taking Aspirin Every Day,\" by the .[place] Dept. of Health and Human Services (www.healthfinder.gov). [employer] 010917 - Rev 03/16.            Metoprolol tablets  Brand Name: Lopressor  What is this medicine?  METOPROLOL (me TOE proe lole) is a beta-blocker. Beta-blockers reduce the workload on the heart and help it to beat more regularly. This medicine is used to treat high blood pressure and to prevent chest pain. It is also used to after a heart attack and to prevent an additional heart attack from occurring.  How should I use this medicine?  Take this medicine by mouth with a drink of water. Follow the directions on the prescription label. Take this medicine immediately after meals. Take your doses at regular intervals. Do not take more medicine than directed. Do not stop taking this medicine suddenly. This could lead to serious heart-related effects.  Talk to your pediatrician regarding the use of this medicine in children. Special care may be needed.  What side effects may I notice from receiving this medicine?  Side effects that you should report to your doctor or health care professional as soon as possible:    allergic reactions like skin rash, itching or hives    cold or numb hands or feet    depression    difficulty breathing    faint    fever with sore throat    irregular heartbeat, chest pain    rapid weight gain    swollen legs or ankles  Side effects that " usually do not require medical attention (report to your doctor or health care professional if they continue or are bothersome):    anxiety or nervousness    change in sex drive or performance    dry skin    headache    nightmares or trouble sleeping    short term memory loss    stomach upset or diarrhea    unusually tired  What may interact with this medicine?  This medicine may interact with the following medications:    certain medicines for blood pressure, heart disease, irregular heart beat    certain medicines for depression like monoamine oxidase (MAO) inhibitors, fluoxetine, or paroxetine    clonidine    dobutamine    epinephrine    isoproterenol    reserpine  What if I miss a dose?  If you miss a dose, take it as soon as you can. If it is almost time for your next dose, take only that dose. Do not take double or extra doses.  Where should I keep my medicine?  Keep out of the reach of children.  Store at room temperature between 15 and 30 degrees C (59 and 86 degrees F). Throw away any unused medicine after the expiration date.  What should I tell my health care provider before I take this medicine?  They need to know if you have any of these conditions:    diabetes    heart or vessel disease like slow heart rate, worsening heart failure, heart block, sick sinus syndrome or Raynaud's disease    kidney disease    liver disease    lung or breathing disease, like asthma or emphysema    pheochromocytoma    thyroid disease    an unusual or allergic reaction to metoprolol, other beta-blockers, medicines, foods, dyes, or preservatives    pregnant or trying to get pregnant    breast-feeding  What should I watch for while using this medicine?  Visit your doctor or health care professional for regular check ups. Contact your doctor right away if your symptoms worsen. Check your blood pressure and pulse rate regularly. Ask your health care professional what your blood pressure and pulse rate should be, and when you  should contact them.  You may get drowsy or dizzy. Do not drive, use machinery, or do anything that needs mental alertness until you know how this medicine affects you. Do not sit or stand up quickly, especially if you are an older patient. This reduces the risk of dizzy or fainting spells. Contact your doctor if these symptoms continue. Alcohol may interfere with the effect of this medicine. Avoid alcoholic drinks.  NOTE:This sheet is a summary. It may not cover all possible information. If you have questions about this medicine, talk to your doctor, pharmacist, or health care provider. Copyright  2018 [employer]                Lisinopril Oral tablet  What is this medicine?  LISINOPRIL (lyse IN oh pril) is an ACE inhibitor. This medicine is used to treat high blood pressure and heart failure. It is also used to protect the heart immediately after a heart attack.  This medicine may be used for other purposes; ask your health care provider or pharmacist if you have questions.  What should I tell my health care provider before I take this medicine?  They need to know if you have any of these conditions:    diabetes    heart or blood vessel disease    immune system disease like lupus or scleroderma    kidney disease    low blood pressure    previous swelling of the tongue, face, or lips with difficulty breathing, difficulty swallowing, hoarseness, or tightening of the throat    an unusual or allergic reaction to lisinopril, other ACE inhibitors, insect venom, foods, dyes, or preservatives    pregnant or trying to get pregnant    breast-feeding  How should I use this medicine?  Take this medicine by mouth with a glass of water. Follow the directions on your prescription label. You may take this medicine with or without food. Take your medicine at regular intervals. Do not stop taking this medicine except on the advice of your doctor or health care professional.  Talk to your pediatrician regarding the use of this medicine  in children. Special care may be needed. While this drug may be prescribed for children as young as 6 years of age for selected conditions, precautions do apply.  Overdosage: If you think you have taken too much of this medicine contact a poison control center or emergency room at once.  NOTE: This medicine is only for you. Do not share this medicine with others.  What if I miss a dose?  If you miss a dose, take it as soon as you can. If it is almost time for your next dose, take only that dose. Do not take double or extra doses.  What may interact with this medicine?    diuretics    lithium    NSAIDs, medicines for pain and inflammation, like ibuprofen or naproxen    over-the-counter herbal supplements like hawthorn    potassium salts or potassium supplements    salt substitutes  This list may not describe all possible interactions. Give your health care provider a list of all the medicines, herbs, non-prescription drugs, or dietary supplements you use. Also tell them if you smoke, drink alcohol, or use illegal drugs. Some items may interact with your medicine.  What should I watch for while using this medicine?  Visit your doctor or health care professional for regular check ups. Check your blood pressure as directed. Ask your doctor what your blood pressure should be, and when you should contact him or her. Call your doctor or health care professional if you notice an irregular or fast heart beat.  Women should inform their doctor if they wish to become pregnant or think they might be pregnant. There is a potential for serious side effects to an unborn child. Talk to your health care professional or pharmacist for more information.  Check with your doctor or health care professional if you get an attack of severe diarrhea, nausea and vomiting, or if you sweat a lot. The loss of too much body fluid can make it dangerous for you to take this medicine.  You may get drowsy or dizzy. Do not drive, use machinery, or do  anything that needs mental alertness until you know how this drug affects you. Do not stand or sit up quickly, especially if you are an older patient. This reduces the risk of dizzy or fainting spells. Alcohol can make you more drowsy and dizzy. Avoid alcoholic drinks.  Avoid salt substitutes unless you are told otherwise by your doctor or health care professional.  Do not treat yourself for coughs, colds, or pain while you are taking this medicine without asking your doctor or health care professional for advice. Some ingredients may increase your blood pressure.  What side effects may I notice from receiving this medicine?  Side effects that you should report to your doctor or health care professional as soon as possible:    abdominal pain with or without nausea or vomiting    allergic reactions like skin rash or hives, swelling of the hands, feet, face, lips, throat, or tongue    dark urine    difficulty breathing    dizzy, lightheaded or fainting spell    fever or sore throat    irregular heart beat, chest pain    pain or difficulty passing urine    redness, blistering, peeling or loosening of the skin, including inside the mouth    unusually weak    yellowing of the eyes or skin  Side effects that usually do not require medical attention (report to your doctor or health care professional if they continue or are bothersome):    change in taste    cough    decreased sexual function or desire    headache    sun sensitivity    tiredness  This list may not describe all possible side effects. Call your doctor for medical advice about side effects. You may report side effects to FDA at 4-922-FDA-3000.  Where should I keep my medicine?  Keep out of the reach of children.  Store at room temperature between 15 and 30 degrees C (59 and 86 degrees F). Protect from moisture. Keep container tightly closed. Throw away any unused medicine after the expiration date.  NOTE:This sheet is a summary. It may not cover all possible  information. If you have questions about this medicine, talk to your doctor, pharmacist, or health care provider. Copyright  2016 Gold Standard

## 2018-07-16 NOTE — PLAN OF CARE
Problem: Patient Care Overview  Goal: Plan of Care/Patient Progress Review  Outcome: No Change  VSS. Monitor remains SR. Pt. Denies pain. Right radial site stable with no bleeding or hematoma. Continue  To monitor and observe closely.

## 2018-07-16 NOTE — ED NOTES
Bed: ST01  Expected date: 7/16/18  Expected time: 8:31 AM  Means of arrival:   Comments:  [phone] STEMI

## 2018-07-16 NOTE — PLAN OF CARE
Problem: Patient Care Overview  Goal: Plan of Care/Patient Progress Review  Outcome: Improving  Patient arrived in ICU , alert+ oriented x4, denied pain, TR band intact with reported 13cc inflated, patient c/o right hand tingling, tingling relieived with removing a total of 6 and elevating arm off bed with pillow, BP elevated Dr Nieto at bedside, orders for new antihypertension meds received, patient given medication handouts on metoprolol, lisinopril , asa, emotional support given , SW consult per patients request , needs help with insurance

## 2018-07-16 NOTE — ED PROVIDER NOTES
History   Chief Complaint:  Chest Pain    HPI   Ronak Malave is a 63 year old transgender female who prefers to be called [facility], who presents with chest pain. The patient reports she began feeling chest pressure and arm pain at 0730 this morning when she was getting ready for work, prompting her to call EMS around 0800. EMS reports the patient had a inferior, narrow complex STEMI en route to the hospital with a change in rhythm to a wide complex rhythm approx 2 minutes prior to arrival to the emergency department. EMS states the patient had a measured BP in the 130-140s en route to the hospital and they administered 3 Nitroglycerin and some Aspirin en route to the hospital. Here in the emergency department, the patient states she feels okay now and reports only mild residual chest pressure, left arm pain, diaphoresis, and dizziness. She describes her chest pain as aching and says she tried to walk off her pain before calling EMS with no resolve in symptoms. The patient states she is currently taking hormone therapy drugs (Finasteride, Spironolactone) and denies any other drug use. She denies any allergies to medications, and has a surgical history of breast augmentation. The patient denies any history of cardiac issues, tobacco use, recreational drug use, and states she had 2 alcoholic drinks yesterday. Of note, the patient states she had a pulmonary embolism about 5-6 years ago and was temporarily on Warfarin and says it happened when she was taking Estradiol.    Allergies:  NKDA    Medications:    Finasteride  Aldactone    Past Medical History:    Pulmonary Embolism    Past Surgical History:    Breast surgery  Orthopedic surgery    Family History:    No past pertinent family history.    Social History:  Marital Status:  Single [1]  Negative for tobacco use.  One alcoholic drink per week.  PCP: Tracey Malcolm  Presents to ED via EMS     Review of Systems   Constitutional: Positive for diaphoresis.  "  Respiratory: Positive for chest tightness.    Cardiovascular: Positive for chest pain and palpitations.   Neurological: Positive for dizziness.   All other systems reviewed and are negative.    Physical Exam   First Vitals:  Patient Vitals for the past 24 hrs:   BP Temp Temp src Heart Rate Resp SpO2 Height Weight   07/16/18 0850 - 97.1  F (36.2  C) Temporal - - - - -   07/16/18 0848 158/90 - - 61 18 98 % 1.88 m (6' 2\") 86.5 kg (190 lb 12.8 oz)     Physical Exam  General/Appearance: appears stated age, well-groomed, appears comfortable  Eyes: EOMI, no scleral injection, no icterus  ENT: MMM  Neck: supple, nl ROM, no stiffness  Cardiovascular: RRR, nl S1S2, no m/r/g, 2+ pulses in all 4 extremities, cap refill <2sec  Respiratory: CTAB, good air movement throughout, no wheezes/rhonchi/rales, no increased WOB, no retractions  Back: no lesions  GI: abd soft, non-distended, nttp,  no HSM, no rebound, no guarding, nl BS  MSK: DAVILA, good tone, no bony abnormality  Skin: warm and well-perfused, no rash, no edema, no ecchymosis, nl turgor  Neuro: GCS 15, alert and oriented, no gross focal neuro deficits  Psych: interacts appropriately  Heme: no petechia, no purpura, no active bleeding    Emergency Department Course   ECG:  Indication: Chest Pain  Time: 0845  Vent. Rate 61 bpm. VA interval 172. QRS duration 80. QT/QTc 424/426. P-R-T axis 58 44 84.  Normal sinus rhythm. ST elevation, consider inferolateral injury or acute infarct. Acute MI/STEMI. Consider rigth ventricular involvement in acute inferior infarct. Abnormal ECG. Read time: 0845.    Laboratory:  Troponin: In Process  CBC: WBC: 7.1, HGB: 16.8(H), PLT: 271  INR: 0.88  Partial Thromboplastin Time: 24  BMP: Glucose 118(H), GFR: 59(L), o/w WNL (Creatinine: 0.96)    Interventions:  0850: Brilinta 180 mg PO    Emergency Department Course:  Past medical records, nursing notes, and vitals reviewed.  0840: I performed an exam of the patient and obtained history, as documented " "above.    0840: The patient was transferred to the Stab room bed.    0842: Patient IV was started in the Stab room.    0844: Patient blood drawn and collected.    0845: Patient ECG was obtained.    0848: I spoke with an intervention cardiologist regarding this patient.    0856: The patient was sent to interventional cardiology.    Impression & Plan    Medical Decision Making:  This patient is a 63-year-old transgender female who presents with chest pain and STEMI.  She was getting ready for work approximately an hour prior to presentation when she began to feel chest pressure.  EMS arrived and gave 3 sublingual nitro as well as aspirin.  EKG showed inferior ST elevation with reciprocal anterolateral depressions.  Just prior to presentation she did go into it transient Y-paced rhythm.  Here her EKG is again showing ST elevation with reciprocal depressions consistent with STEMI.  Although she still has very mild \"residual\" symptoms she says she does not actually have any pain.  Blood pressure and heart rate are within normal limits.  She is already getting heparin.  As Cath Lab was ready she is getting Brilinta however the heparin is being held off as we have not had a chance to get portable chest x-ray.  She will be going to the Cath Lab for more definitive care.    Critical Care Time, independent of procedures: 27 min.    Diagnosis:    ICD-10-CM   1. STEMI (ST elevation myocardial infarction) (H) I21.3     Scribe Disclosure:  I, Dayron Vázquez, am serving as a scribe on 7/16/2018 at 8:40 AM to personally document services performed by No att. providers found based on my observations and the provider's statements to me.     Dayron Vázquez  7/16/2018    EMERGENCY DEPARTMENT       Silvina Berger MD  07/16/18 1114       Silvina Berger MD  07/16/18 1121    "

## 2018-07-17 ENCOUNTER — APPOINTMENT (OUTPATIENT)
Dept: OCCUPATIONAL THERAPY | Facility: CLINIC | Age: 63
DRG: 282 | End: 2018-07-17
Attending: PHYSICIAN ASSISTANT

## 2018-07-17 PROBLEM — E78.5 HYPERLIPIDEMIA LDL GOAL <70: Status: ACTIVE | Noted: 2018-07-17

## 2018-07-17 PROBLEM — I10 BENIGN ESSENTIAL HTN: Status: ACTIVE | Noted: 2018-07-17

## 2018-07-17 LAB
CHOLEST SERPL-MCNC: 221 MG/DL
HDLC SERPL-MCNC: 58 MG/DL
LDLC SERPL CALC-MCNC: 126 MG/DL
NONHDLC SERPL-MCNC: 163 MG/DL
TRIGL SERPL-MCNC: 187 MG/DL
TROPONIN I SERPL-MCNC: 14.75 UG/L (ref 0–0.04)
TROPONIN I SERPL-MCNC: 26.09 UG/L (ref 0–0.04)

## 2018-07-17 PROCEDURE — 25000132 ZZH RX MED GY IP 250 OP 250 PS 637: Performed by: INTERNAL MEDICINE

## 2018-07-17 PROCEDURE — 25000132 ZZH RX MED GY IP 250 OP 250 PS 637: Performed by: STUDENT IN AN ORGANIZED HEALTH CARE EDUCATION/TRAINING PROGRAM

## 2018-07-17 PROCEDURE — 21000001 ZZH R&B HEART CARE

## 2018-07-17 PROCEDURE — 84484 ASSAY OF TROPONIN QUANT: CPT | Performed by: INTERNAL MEDICINE

## 2018-07-17 PROCEDURE — 99232 SBSQ HOSP IP/OBS MODERATE 35: CPT | Performed by: INTERNAL MEDICINE

## 2018-07-17 PROCEDURE — 80061 LIPID PANEL: CPT | Performed by: INTERNAL MEDICINE

## 2018-07-17 PROCEDURE — 36415 COLL VENOUS BLD VENIPUNCTURE: CPT | Performed by: INTERNAL MEDICINE

## 2018-07-17 PROCEDURE — 97165 OT EVAL LOW COMPLEX 30 MIN: CPT | Mod: GO | Performed by: OCCUPATIONAL THERAPIST

## 2018-07-17 PROCEDURE — 97110 THERAPEUTIC EXERCISES: CPT | Mod: GO | Performed by: OCCUPATIONAL THERAPIST

## 2018-07-17 PROCEDURE — 97535 SELF CARE MNGMENT TRAINING: CPT | Mod: GO | Performed by: OCCUPATIONAL THERAPIST

## 2018-07-17 PROCEDURE — 25000132 ZZH RX MED GY IP 250 OP 250 PS 637: Performed by: PHYSICIAN ASSISTANT

## 2018-07-17 PROCEDURE — 40000133 ZZH STATISTIC OT WARD VISIT: Performed by: OCCUPATIONAL THERAPIST

## 2018-07-17 RX ORDER — CARVEDILOL 3.12 MG/1
3.12 TABLET ORAL 2 TIMES DAILY WITH MEALS
Status: DISCONTINUED | OUTPATIENT
Start: 2018-07-17 | End: 2018-07-18 | Stop reason: HOSPADM

## 2018-07-17 RX ORDER — SPIRONOLACTONE 25 MG/1
50 TABLET ORAL 2 TIMES DAILY
Status: DISCONTINUED | OUTPATIENT
Start: 2018-07-17 | End: 2018-07-18 | Stop reason: HOSPADM

## 2018-07-17 RX ORDER — FINASTERIDE 5 MG/1
5 TABLET, FILM COATED ORAL DAILY
Status: DISCONTINUED | OUTPATIENT
Start: 2018-07-17 | End: 2018-07-18 | Stop reason: HOSPADM

## 2018-07-17 RX ADMIN — SPIRONOLACTONE 50 MG: 25 TABLET ORAL at 10:49

## 2018-07-17 RX ADMIN — ASPIRIN 81 MG: 81 TABLET, COATED ORAL at 08:09

## 2018-07-17 RX ADMIN — CARVEDILOL 3.12 MG: 3.12 TABLET, FILM COATED ORAL at 22:11

## 2018-07-17 RX ADMIN — CLOPIDOGREL 75 MG: 75 TABLET, FILM COATED ORAL at 08:09

## 2018-07-17 RX ADMIN — ATORVASTATIN CALCIUM 80 MG: 80 TABLET, FILM COATED ORAL at 20:53

## 2018-07-17 RX ADMIN — SPIRONOLACTONE 50 MG: 25 TABLET ORAL at 20:52

## 2018-07-17 RX ADMIN — FINASTERIDE 5 MG: 5 TABLET, FILM COATED ORAL at 10:49

## 2018-07-17 RX ADMIN — METOPROLOL TARTRATE 25 MG: 25 TABLET ORAL at 08:09

## 2018-07-17 RX ADMIN — LISINOPRIL 2.5 MG: 2.5 TABLET ORAL at 08:09

## 2018-07-17 ASSESSMENT — ACTIVITIES OF DAILY LIVING (ADL)
ADLS_ACUITY_SCORE: 9
ADLS_ACUITY_SCORE: 9
PREVIOUS_RESPONSIBILITIES: MEAL PREP;HOUSEKEEPING;LAUNDRY;SHOPPING;YARDWORK;MEDICATION MANAGEMENT;FINANCES;DRIVING;WORK
ADLS_ACUITY_SCORE: 9

## 2018-07-17 NOTE — PLAN OF CARE
Problem: Patient Care Overview  Goal: Plan of Care/Patient Progress Review  Outcome: No Change  AAOx4. VS ex BP's have been on softer side SBP 80-90's. Pt is asymptomatic. No c/o CP. Up ad ralph in room. R radial site --CDI. Plan is medical management, cardiac rehab in AM, and probable d/c tomorrow. SW following for lack of insurance; message left with financial office to consult with patient. Continue to monitor.

## 2018-07-17 NOTE — PROGRESS NOTES
07/17/18 1513   Quick Adds   Type of Visit Initial Occupational Therapy Evaluation   Living Environment   Lives With other (see comments)  (roomate)   Living Arrangements house   Home Accessibility stairs to enter home   Number of Stairs to Enter Home 3   Number of Stairs Within Home (basement)   Transportation Available car   Self-Care   Usual Activity Tolerance good   Regular Exercise yes   Activity/Exercise Type walking  (on the job or walks)   Exercise Amount/Frequency 2 times/wk   Functional Level Prior   Ambulation 0-->independent   Transferring 0-->independent   Toileting 0-->independent   Bathing 0-->independent   Dressing 0-->independent   Eating 0-->independent   Communication 0-->understands/communicates without difficulty   Swallowing 0-->swallows foods/liquids without difficulty   Cognition 0 - no cognition issues reported   Fall history within last six months no   Prior Functional Level Comment 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    General Information   Onset of Illness/Injury or Date of Surgery - Date 07/16/18   Referring Physician Jennifer Mccarthy PA-C   Patient/Family Goals Statement return home   Additional Occupational Profile Info/Pertinent History of Current Problem STEMI, s/p angio, revealswith occluded distal OM4 - managed medically as vessel was very small. Troponin peaked to 26 and EF 50%    Precautions/Limitations (MI precautions)   Heart Disease Risk Factors Dislipidemia;High blood pressure;Age;Gender;Lack of physical activity;Stress   Cognitive Status Examination   Orientation orientation to person, place and time   Level of Consciousness alert   Able to Follow Commands WNL/WFL   Personal Safety (Cognitive) WNL/WFL   Memory intact   Attention No deficits were identified   Organization/Problem Solving No deficits were identified   Executive Function No deficits were identified   Visual Perception   Visual Perception Wears glasses  (for reading)   Sensory  "Examination   Sensory Quick Adds No deficits were identified   Pain Assessment   Patient Currently in Pain No   Transfer Skills   Transfer Comments Pt independent with ADL's and functional mobility.    Instrumental Activities of Daily Living (IADL)   Previous Responsibilities meal prep;housekeeping;laundry;shopping;yardwork;medication management;finances;driving;work   Activities of Daily Living Analysis   Impairments Contributing to Impaired Activities of Daily Living post surgical precautions  (MI Precautions)   General Therapy Interventions   Planned Therapy Interventions risk factor education;progressive activity/exercise;home program guidelines   Clinical Impression   Criteria for Skilled Therapeutic Interventions Met yes, treatment indicated   OT Diagnosis decreased IADLs'   Influenced by the following impairments MI precautions, CAD riskf actors   Assessment of Occupational Performance 1-3 Performance Deficits   Identified Performance Deficits impaired IADL's ie work, driving, mowing yard, heavier cleaning, ie vacuuming, etc   Clinical Decision Making (Complexity) Low complexity   Therapy Frequency 2 times/day   Predicted Duration of Therapy Intervention (days/wks) 2 days   Anticipated Discharge Disposition Home with Outpatient Therapy  (OP CR at [facility])   Risks and Benefits of Treatment have been explained. Yes   Patient, Family & other staff in agreement with plan of care Yes   [facility] AM-PAC  \"6 Clicks\" Daily Activity Inpatient Short Form   1. Putting on and taking off regular lower body clothing? 4 - None   2. Bathing (including washing, rinsing, drying)? 4 - None   3. Toileting, which includes using toilet, bedpan or urinal? 4 - None   4. Putting on and taking off regular upper body clothing? 4 - None   5. Taking care of personal grooming such as brushing teeth? 4 - None   6. Eating meals? 4 - None   Daily Activity Raw Score (Score out of 24.Lower scores equate to lower levels of function) 24 "   Total Evaluation Time   Total Evaluation Time (Minutes) 10

## 2018-07-17 NOTE — PLAN OF CARE
Problem: Patient Care Overview  Goal: Plan of Care/Patient Progress Review  Outcome: Improving  VSS. Monitor remains SR. Pt. Denies pain. Right radial site stable. Plan for Cardiac rehab.

## 2018-07-17 NOTE — CONSULTS
Care Transition Initial Assessment -   Reason For Consult: financial concerns, medication concerns  Met with: Patient    Active Problems:    STEMI (ST elevation myocardial infarction) (H)    Hyperlipidemia LDL goal <70    Benign essential HTN       DATA  Lives With: other (see comments) (Room-mate)     Identified issues/concerns regarding health management:       Reviewed chart.  Patient has no insurance.  Message left for the financial office to follow up with patient regarding insurance issues.  Order entered for the discharge pharmacy liaison for discharge medication assistance.    ASSESSMENT  Cognitive Status:  awake and alert  Concerns to be addressed: financial concerns.     PLAN  Financial costs for the patient includes patient has no insurance.  Message left for the financial office.  Patient given options and choices for discharge N/A.  Patient/family is agreeable to the plan?  Yes  Patient Goals and Preferences: Return home on discharge.  Patient anticipates discharging to:  Home.    Will continue to follow and assist with a safe discharge plan.    DANDRE Warner, [facility]  [phone]

## 2018-07-17 NOTE — PROGRESS NOTES
[facility]  Cardiology Progress Note  Date of Service: 07/17/2018  Primary Cardiologist: will be Dr. Nieto    Assessment & Plan    Bola (Ronak) ROBERT Malave is a 63 year old female with past medical history significant for male to female transgender, and hx of PE when on estrogen admitted on 7/16/2018 with chest pressure and found to have inferior STEMI.    Assessment:  1.  Inferior STEMI, with occluded distal OM4 - managed medically as vessel was very small   - l main normal, mid lad with 30% lesion, d2 ostial 50% lesion, RCA with minimal disease, Circ with 50% ostial lesion   - trop initially pos, not trended- will repeat   - Echo shows EF low normal at 50%   - LVEDP 29 on angio    2.  HTN, started on lisinopril and metoprolol here    3.  Hyperlipidemia, , HDL 58, chol 222   - started on lipitor 80    4.  Transgender male to female, will continue spironolactone and finasteride   - has not been on female hormones for years due to hx of PE    5. Uninsured, will ask social work to see      Plan:   1. Continue lisinopril and metoprolol- lvedp should improve with acei  2. Trend trop  3. Continues asa and plavix  4. Social work to see for help with insurance issues  5. Cardiac rehab inpt/ outpt  6. 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 Nieto  7. Will discuss timing of d/c with Dr. Nieto- likely tomorrow am    Jennifer Mccarthy PA-C  [facility]  Pager: [phone]     Interval History   Feels well, denies cp or chest tightness.  No orthopnea or pnd.  Wrist feels fine.  Understands need for meds.    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 Often works morning til midnight depending on shows.      Physical Exam   Temp: 98.3  F (36.8  C) Temp src: Oral BP: 119/80   Heart Rate: 60 Resp: 16 SpO2: 98 % O2 Device: None (Room air)    Vitals:    07/16/18 0848 07/17/18 0500    Weight: 86.5 kg (190 lb 12.8 oz) 83.9 kg (184 lb 14.4 oz)       Constitutional   alert and oriented, in no acute distress.  Skin   warm and dry to touch  ENT   no pallor or cyanosis  Neck  No JVP at 30d  Lungs ctab  Cardiac rrr, no murmur or rub  Abdomen   abdomen soft, bowel sounds normoactive, no hepatosplenomegaly  Extremities and Back    no edema.  2+ right radial pulse  no bruit  Neurological   no gross motor deficits noted, affect appropriate, oriented to time, person and place.    Medications     sodium chloride Stopped (07/16/18 8055)       aspirin  81 mg Oral Daily     atorvastatin  80 mg Oral QPM     clopidogrel  75 mg Oral Daily     lisinopril  2.5 mg Oral Daily     metoprolol tartrate  25 mg Oral BID     sodium chloride (PF)  3 mL Intracatheter Q8H       Data   Most Recent 3 CBC's:  Recent Labs   Lab Test  07/16/18   0844  01/22/14   1130  01/22/14   1055   WBC  7.1  11.7*  Duplicate request  Charge credited CORRECTED ON 01/22 AT 1139: PREVIOUSLY REPORTED AS 11.7   HGB  16.8*  15.4   --    MCV  89  92   --    PLT  271  337   --      Most Recent 3 BMP's:  Recent Labs   Lab Test  07/16/18   0844  01/22/14   1055   NA  139  140   POTASSIUM  3.4  4.2   CHLORIDE  104  104   CO2  25  28   BUN  16  20   CR  0.96  0.77   ANIONGAP  10  8   YVES  8.5  8.9   GLC  118*  112*     Most Recent 3 Troponin's:  Recent Labs   Lab Test  07/16/18   0844  01/22/14   1300   TROPI  0.214*  <0.012     Last 24 hours labs reviewed     Tele: sinus

## 2018-07-17 NOTE — PLAN OF CARE
Problem: Patient Care Overview  Goal: Plan of Care/Patient Progress Review  Outcome: Improving  Pt A and O x4, VSS overnight. Pain free. Right radial site C/D/I without hematoma or bruit. Up independently. Continue education on new meds. Tele SB/SR.

## 2018-07-17 NOTE — PLAN OF CARE
Problem: Patient Care Overview  Goal: Plan of Care/Patient Progress Review  Discharge Planner OT   Patient plan for discharge: home  Current status: Eval completed and treatment initiated. Pt lives in a house with a roommate. 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 Pt admitted due to STEMI, s/p angio, with occluded distal OM4 - managed medically as vessel was very small. Pt ambulated approx 130 ft x 2, completed TDM at gradual increase to 2 mph for 10.5 mins and completed 15 stairs with no reported symptoms and stable CV response, see vital sign flow sheet for details. Pt educated in CAD risk factors with focus on low cholesterol and low sodium diet and exercise. Pt receptive to info and appears motivated to attend OP CR at hospitals< however, reports does not have any insurance.   Barriers to return to prior living situation: none A with IADl's ie mowing lawn, per MD when OK to drive and return to work etc  Recommendations for discharge: home with OP CR at [facility], unsure if pt will be to attend OP CR as reports does not have any health insurance.   Rationale for recommendations: OP CR for monitored progressive exercise and risk factor education and modification.        Entered by: Yessica Duque 07/17/2018 4:14 PM

## 2018-07-17 NOTE — PLAN OF CARE
Problem: Patient Care Overview  Goal: Plan of Care/Patient Progress Review  Outcome: Improving  BP elevated, all other VSS. Pt denies CP, SOB. Up independently in room. R radial site CDI, CMS intact. SW consult ordered. Continue to monitor.

## 2018-07-17 NOTE — PHARMACY-ADMISSION MEDICATION HISTORY
Admission medication history interview status for the 7/16/2018  admission is complete. See EPIC admission navigator for prior to admission medications     Medication history source reliability:Good    Actions taken by pharmacist (provider contacted, etc):  Spoke w/ patient.  Reviewed information in Care Everywhere.      Additional medication history information not noted on PTA med list :None    Medication reconciliation/reorder completed by provider prior to medication history? No    Time spent in this activity: 15 minutes    Prior to Admission medications    Medication Sig Last Dose Taking? Auth Provider   FINASTERIDE PO Take 5 mg by mouth daily   Yes Reported, Patient   glucosamine-chondroitin 500-400 MG CAPS Take 1 capsule by mouth daily  Yes Unknown, Entered By History   Multiple Vitamins-Minerals (HAIR SKIN AND NAILS FORMULA PO) Take 2 capsules by mouth daily  Yes Unknown, Entered By History   SPIRONOLACTONE PO Take 50 mg by mouth 2 times daily  Yes Unknown, Entered By History     Melissa Padron, PharmD, BCPS

## 2018-07-18 ENCOUNTER — APPOINTMENT (OUTPATIENT)
Dept: OCCUPATIONAL THERAPY | Facility: CLINIC | Age: 63
DRG: 282 | End: 2018-07-18
Attending: PHYSICIAN ASSISTANT

## 2018-07-18 VITALS
BODY MASS INDEX: 23.79 KG/M2 | HEART RATE: 70 BPM | SYSTOLIC BLOOD PRESSURE: 95 MMHG | RESPIRATION RATE: 16 BRPM | TEMPERATURE: 98.1 F | WEIGHT: 185.4 LBS | OXYGEN SATURATION: 97 % | HEIGHT: 74 IN | DIASTOLIC BLOOD PRESSURE: 56 MMHG

## 2018-07-18 PROCEDURE — 25000132 ZZH RX MED GY IP 250 OP 250 PS 637: Performed by: STUDENT IN AN ORGANIZED HEALTH CARE EDUCATION/TRAINING PROGRAM

## 2018-07-18 PROCEDURE — 25000132 ZZH RX MED GY IP 250 OP 250 PS 637: Performed by: INTERNAL MEDICINE

## 2018-07-18 PROCEDURE — 99239 HOSP IP/OBS DSCHRG MGMT >30: CPT | Performed by: INTERNAL MEDICINE

## 2018-07-18 PROCEDURE — 25000132 ZZH RX MED GY IP 250 OP 250 PS 637: Performed by: PHYSICIAN ASSISTANT

## 2018-07-18 PROCEDURE — 97110 THERAPEUTIC EXERCISES: CPT | Mod: GO | Performed by: OCCUPATIONAL THERAPIST

## 2018-07-18 PROCEDURE — 40000133 ZZH STATISTIC OT WARD VISIT: Performed by: OCCUPATIONAL THERAPIST

## 2018-07-18 RX ORDER — LISINOPRIL 2.5 MG/1
2.5 TABLET ORAL DAILY
Qty: 90 TABLET | Refills: 3 | Status: SHIPPED | OUTPATIENT
Start: 2018-07-19 | End: 2018-07-18

## 2018-07-18 RX ORDER — NITROGLYCERIN 0.4 MG/1
TABLET SUBLINGUAL
Qty: 25 TABLET | Refills: 3 | Status: SHIPPED | OUTPATIENT
Start: 2018-07-18

## 2018-07-18 RX ORDER — ATORVASTATIN CALCIUM 80 MG/1
80 TABLET, FILM COATED ORAL EVERY EVENING
Qty: 90 TABLET | Refills: 3 | Status: SHIPPED | OUTPATIENT
Start: 2018-07-18

## 2018-07-18 RX ORDER — CLOPIDOGREL BISULFATE 75 MG/1
75 TABLET ORAL DAILY
Qty: 90 TABLET | Refills: 3 | Status: SHIPPED | OUTPATIENT
Start: 2018-07-19

## 2018-07-18 RX ORDER — CARVEDILOL 3.12 MG/1
3.12 TABLET ORAL 2 TIMES DAILY WITH MEALS
Qty: 180 TABLET | Refills: 3 | Status: SHIPPED | OUTPATIENT
Start: 2018-07-18

## 2018-07-18 RX ADMIN — CARVEDILOL 3.12 MG: 3.12 TABLET, FILM COATED ORAL at 09:32

## 2018-07-18 RX ADMIN — ASPIRIN 81 MG: 81 TABLET, COATED ORAL at 09:32

## 2018-07-18 RX ADMIN — SPIRONOLACTONE 50 MG: 25 TABLET ORAL at 09:32

## 2018-07-18 RX ADMIN — FINASTERIDE 5 MG: 5 TABLET, FILM COATED ORAL at 09:32

## 2018-07-18 RX ADMIN — CLOPIDOGREL 75 MG: 75 TABLET, FILM COATED ORAL at 09:32

## 2018-07-18 RX ADMIN — LISINOPRIL 2.5 MG: 2.5 TABLET ORAL at 09:32

## 2018-07-18 ASSESSMENT — ACTIVITIES OF DAILY LIVING (ADL)
ADLS_ACUITY_SCORE: 9

## 2018-07-18 NOTE — PROVIDER NOTIFICATION
Dr Jang notified regarding pt's scheduled metoprolol dose and SBP in 80-90's, asked for parameters on medication. Order given to discontinue metoprolol. Order to start carvedilol PO 3.125mg BID, hold if SBP < 90. Okay to give first dose this evening.

## 2018-07-18 NOTE — PLAN OF CARE
Problem: Patient Care Overview  Goal: Plan of Care/Patient Progress Review  Outcome: Improving  Pt VSS on RA, bp initially very soft, recheck WNL. Pt asymptomatic. Tele SR. A&Ox4. Up ad ralph, using bathroom. Right wrist CDI, no bruit. Denies pain. Pt and friend asked many questions regarding lifestyle changes to prevent future blockages. Slept. Plan for cardiac rehab, sw consult. Continue to monitor.

## 2018-07-18 NOTE — PLAN OF CARE
Problem: Patient Care Overview  Goal: Plan of Care/Patient Progress Review  Outcome: No Change  [id number]: BP soft, cardiology notified for BP med parameters. A&O x4. Denies pain. Right radial site CDI. Up independently in room. Likely discharge tomorrow.

## 2018-07-18 NOTE — PLAN OF CARE
Problem: Patient Care Overview  Goal: Plan of Care/Patient Progress Review  Outcome: Completed Date Met: 07/18/18  Occupational Therapy Discharge Summary    Reason for therapy discharge:    Discharged to home with outpatient therapy.    Progress towards therapy goal(s). See goals on Care Plan in [facility] electronic health record for goal details.  Goals met    Therapy recommendation(s):    Continued therapy is recommended.  Rationale/Recommendations:  Recommend continued OP CR to increase endurance and a heart healthy lifestyle.  Pt currently does not have insurance, will have to look at options for payment of these services..

## 2018-07-18 NOTE — PLAN OF CARE
Problem: Patient Care Overview  Goal: Plan of Care/Patient Progress Review  Discharge Planner OT   Patient plan for discharge: home  Current status: Pt completed 15 minutes on the treadmill gradually working up to 2.2 MPH and did 15 minutes on the stairs.  Rated exertion at 3/10.  HR up to 115 at times but BP normal and HR came down to 72 shortly after exercise complete.  Pt educated on walking program and handout given.  Pt receptive to info and appears motivated to attend OP CR at [facility] however, reports does not have any insurance.   Barriers to return to prior living situation: none A with IADl's ie mowing lawn, per MD when OK to drive and return to work etc  Recommendations for discharge: home with OP CR at [facility], unsure if pt will be to attend OP CR as reports does not have any health insurance.   Rationale for recommendations: OP CR for monitored progressive exercise and risk factor education and modification.        Entered by: Ky Caraballo 07/18/2018 8:22 AM

## 2018-07-18 NOTE — DISCHARGE SUMMARY
[facility]    Discharge Summary  Cardiology    Date of Admission:  7/16/2018  Date of Discharge:  7/18/2018  Discharging Provider:  Dr. Nieto  Date of Service (when I saw the patient): 07/18/18    Discharge Diagnoses   1.  Inferior STEMI, with occluded distal OM4 - managed medically as vessel was very small and distal                        - L main normal, mid lad with 30% lesion, d2 ostial 50% lesion, RCA with minimal disease, Circ with 50% ostial lesion                        - trop initially pos, not trended- will repeat                        - Echo shows EF low normal at 50%                        - LVEDP 29 on angio     2.  HTN, started on lisinopril and metoprolol here with soft bps overnight, but asymptomatic   - ADDENDUM, lisinopril d/c'd by Dr. Nieto due to hypotension     3.  Hyperlipidemia, , HDL 58, chol 222                        - started on lipitor 80     4.  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 Uninsured, financial office contacted    6.  Elevated hgb hct, rbc on admission    - will repeat cbc as outpt to r/o polycythemia vera    7.  Brief 14 beat run of afib at HR 120s.  Asymptomatic, will plan on outpt holter monitor once insured    History of Present Illness   Bola Malave (Brett) is an 63 year old female who presented with chest pain and found to have inferior stemi.      Hospital Course   Bola Malave (Brett) is an 63 year old female who presented with chest pain and found to have inferior stemi.  She went emergently for coronary angiogram,   This demonstrated a 100% thrombotic OM before with occlusion of the distal end of the vessel.  Her circumflex otherwise had 50% lesion ostially, mid LAD had a 30% stenosis, ostial D2 had a 50% stenosis in the RCA had minimal disease.  Given that her RCA was distal in the vessel was small but elected  to not treat this lesion.  LVEDP was elevated at 29 at the time of cath.  She was monitored and did not have additional chest pain symptoms.  Her troponins peaked at 26 and echocardiogram showed  a low normal EF moderate apical wall hypokinesis but without significant valvular disease.  She completed cardiac rehab without difficulty although was noted to have a brief run of A. fib while here.  This will be monitored with a Holter monitor point forward.  As the patient is currently on insured and the run was brief the plan is for this to be evaluated when she does have insurance.  In addition her hemoglobin in hematocrit were noted to be elevated on admission which brings up the thought of polycythemia vera.  This will be rechecked as an outpatient and if remains elevated will refer to primary or hematology.  She felt well without signs or symptoms of heart failure or ongoing angina after angiogram and was discharged in good condition.    Active Problems:    STEMI (ST elevation myocardial infarction) (H)    Hyperlipidemia LDL goal <70    Benign essential HTN      Jennifer Mccarthy PA-C    Significant Results and Procedures   Echo 7/16  There is moderate apical wall hypokinesis.  The visual ejection fraction is estimated at 50-55%.  The left ventricle is normal in size.  Doppler interrogation does not demonstrate significant stenosis or insufficiency involving cardiac valves/     Threre is a small discreate region of inferoapical hypokinesis. There are no old studies available for comparison      Angiogram:  1. STEMI possibly due to thrombotic occlusion of the distal OM4. No other clear culprit lesion seen to explain symptoms and ST-elevations.    2. Single vessel obstructive coronary artery disease without left main involvement involving a thrombotic occlusion of OM4. There is mild nonobstructive coronary artery disease in the other vessels.  3. Attempted PCI of OM4. Wiring of the lesion revealed a very  small-caliber  distal vessel covering a small territory. No attempt was made at ballooning were stenting.  4. Elevated left ventricular filling pressures with left ventricular end-diastolic pressure 29 mmHg.    Pending Results  none    Code Status   Full Code    Primary Care Physician   Tracey Malcolm    Physical Exam   Temp: 97.7  F (36.5  C) Temp src: Oral BP: 101/64 Pulse: 88 Heart Rate: 88 Resp: 16 SpO2: 95 % O2 Device: None (Room air)    Vitals:    07/16/18 0848 07/17/18 0500 07/18/18 0500   Weight: 86.5 kg (190 lb 12.8 oz) 83.9 kg (184 lb 14.4 oz) 84.1 kg (185 lb 6.4 oz)     Vital Signs with Ranges  Temp:  [97.4  F (36.3  C)-98.6  F (37  C)] 97.7  F (36.5  C)  Pulse:  [88] 88  Heart Rate:  [] 88  Resp:  [14-18] 16  BP: ()/(54-70) 101/64  SpO2:  [94 %-100 %] 95 %  I/O last 3 completed shifts:  In: 240 [P.O.:240]  Out: -   Constitutional   alert and oriented, in no acute distress.  Skin   warm and dry to touch  ENT   no pallor or cyanosis  Neck  No JVP at 30d  Lungs ctab  Cardiac rrr, no murmur or rub  Abdomen   abdomen soft, bowel sounds normoactive, no hepatosplenomegaly  Extremities and Back    no edema.  2+ right radial pulse  no bruit  Neurological   no gross motor deficits noted, affect appropriate, oriented to time, person and place.      Time Spent on this Encounter   Jennifer CALHOUN, personally saw the patient today and spent greater than 30 minutes discharging this patient.    Discharge Disposition   Discharged to home  Condition at discharge: Good    Consultations This Hospital Stay   PHARMACY IP CONSULT  PHARMACY IP CONSULT  CARDIOLOGY IP CONSULT  SOCIAL WORK IP CONSULT  SOCIAL WORK IP CONSULT  CARDIAC REHAB IP CONSULT  PHARMACY LIAISON FOR MEDICATION COVERAGE CONSULT  SOCIAL WORK IP CONSULT  SMOKING CESSATION PROGRAM IP CONSULT    Discharge Orders     Basic metabolic panel     CBC with platelets   Last Lab Result: Hemoglobin (g/dL)      Date                     Value                07/16/2018                16.8 (H)         ----------     Cardiac Rehab Referral     Follow-Up with Cardiac Advanced Practice Provider     Reason for your hospital stay   You had a heart attack.     Follow-up and recommended labs and tests    With your primary care doctor in 2 weeks.     Activity   Per cardiac rehab handouts     Full Code     Diet   Follow this diet upon discharge: Orders Placed This Encounter     Low Saturated Fat Na <2400 mg       Discharge Medications   Current Discharge Medication List      START taking these medications    Details   aspirin 81 MG EC tablet Take 1 tablet (81 mg) by mouth daily  Qty: 100 tablet, Refills: 3    Associated Diagnoses: ST elevation myocardial infarction (STEMI) involving other coronary artery (H)      atorvastatin (LIPITOR) 80 MG tablet Take 1 tablet (80 mg) by mouth every evening  Qty: 90 tablet, Refills: 3    Associated Diagnoses: Hyperlipidemia LDL goal <70      carvedilol (COREG) 3.125 MG tablet Take 1 tablet (3.125 mg) by mouth 2 times daily (with meals)  Qty: 180 tablet, Refills: 3    Associated Diagnoses: ST elevation myocardial infarction (STEMI) involving other coronary artery (H)      clopidogrel (PLAVIX) 75 MG tablet Take 1 tablet (75 mg) by mouth daily  Qty: 90 tablet, Refills: 3    Associated Diagnoses: ST elevation myocardial infarction (STEMI) involving other coronary artery (H)      nitroGLYcerin (NITROSTAT) 0.4 MG sublingual tablet For chest pain place 1 tablet under the tongue every 5 minutes for 3 doses. If symptoms persist 5 minutes after 1st dose call 911.  Qty: 25 tablet, Refills: 3    Associated Diagnoses: ST elevation myocardial infarction (STEMI) involving other coronary artery (H)         CONTINUE these medications which have NOT CHANGED    Details   FINASTERIDE PO Take 5 mg by mouth daily       glucosamine-chondroitin 500-400 MG CAPS Take 1 capsule by mouth daily      Multiple Vitamins-Minerals (HAIR SKIN AND NAILS FORMULA PO) Take 2 capsules by mouth daily       SPIRONOLACTONE PO Take 50 mg by mouth 2 times daily           Allergies   No Known Allergies  Data   Most Recent 3 CBC's:  Recent Labs   Lab Test  07/16/18   0844  01/22/14   1130  01/22/14   1055   WBC  7.1  11.7*  Duplicate request  Charge credited CORRECTED ON 01/22 AT 1139: PREVIOUSLY REPORTED AS 11.7   HGB  16.8*  15.4   --    MCV  89  92   --    PLT  271  337   --       Most Recent 3 BMP's:  Recent Labs   Lab Test  07/16/18   0844  01/22/14   1055   NA  139  140   POTASSIUM  3.4  4.2   CHLORIDE  104  104   CO2  25  28   BUN  16  20   CR  0.96  0.77   ANIONGAP  10  8   YVES  8.5  8.9   GLC  118*  112*     Most Recent 2 LFT's:  Recent Labs   Lab Test  01/22/14   1055   AST  28   ALT  29   ALKPHOS  82   BILITOTAL  0.8     Most Recent INR's and Anticoagulation Dosing History:  Anticoagulation Dose History     Recent Dosing and Labs Latest Ref Rng & Units 1/22/2014 1/23/2014 7/16/2018    Warfarin 7.5 mg - 7.5 mg - -    INR 0.86 - 1.14 0.96 1.01 0.88        Most Recent 3 Troponin's:  Recent Labs   Lab Test  07/17/18   1411  07/17/18   0534  07/16/18   0844   TROPI  14.750*  26.089*  0.214*     Most Recent Cholesterol Panel:  Recent Labs   Lab Test  07/17/18   0534   CHOL  221*   LDL  126*   HDL  58   TRIG  187*     Most Recent 6 Bacteria Isolates From Any Culture (See EPIC Reports for Culture Details):No lab results found.  Most Recent TSH, T4 and A1c Labs:No lab results found.

## 2018-07-18 NOTE — PLAN OF CARE
Problem: Patient Care Overview  Goal: Plan of Care/Patient Progress Review  Outcome: Adequate for Discharge Date Met: 07/18/18  RN: Alert and oriented x 4, up Ind without assistance. Denies CP, SOB, nausea, dizziness. VSS on RA. Tele NSR. Cardiac rehab completed today on unit, pt to continue outpt. Tolerating cardiac diet. Radial site covered with BA, CDI. CMS intact. New home medications Plavix, ASA, Coreg, Lipitor, Nitro explained and supply sent home with pt. Writer reviewed all d/c orders, including f/u appts and when to notify MD. Pt voiced understanding. Ambulated Ind off unit, friend transporting home.

## 2018-07-18 NOTE — DISCHARGE INSTRUCTIONS
A follow-up appointment was scheduled for you [see above].  It is very important to go to it--bring these papers and your insurance card with you.  At the visit, talk about your hospital stay.  Tell your doctor how you feel.  Show your new list of medications.  Your doctor will update records, make sure you are still doing OK, and decide if any tests or medication changes are needed.    + cardiology definitely wants you seen at the above appointment  + your insurance may not start until August 1, and may not backdate to July  + Sarbjit (ROSHAN) assisted you with the [facility] information      [facility] Financial Counselor (FC): [phone]      Cardiac Angiogram Discharge Instructions - Radial    After you go home:      Have an adult stay with you until tomorrow.    Drink extra fluids for 2 days.    You may resume your normal diet.    No smoking       For 24 hours - due to the sedation you received:    Relax and take it easy.    Do NOT make any important or legal decisions.    Do NOT drive or operate machines at home or at work.    Do NOT drink alcohol.    Care of Wrist Puncture Site:      For the first 24 hrs - check the puncture site every 1-2 hours while awake.    It is normal to have soreness at the puncture site and mild tingling in your hand for up to 3 days.    Remove the bandaid after 24 hours. If there is minor oozing, apply another bandaid and remove it after 12 hours.    You may shower tomorrow.  Do NOT take a bath, or use a hot tub or pool for at least 3 days. Do NOT scrub the site. Do not use lotion or powder near the puncture site.           Activity:        For 2 days:     do not use your hand or arm to support your weight (such as rising from a chair)     do not bend your wrist (such as lifting a garage door).    do not lift more than 5 pounds or exercise your arm (such as tennis, golf or bowling).    Do NOT do any heavy activity such as exercise, lifting, or straining.     Bleeding:      If  you start bleeding from the site in your wrist, sit down and press firmly on/above the site for 10 minutes.     Once bleeding stops, keep arm still for 2 hours.     Call [facility] as soon as you can.       Call 911 right away if you have heavy bleeding or bleeding that does not stop.      Medicines:      If you are taking an antiplatelet medication such as Plavix, Brilinta or Effient, do not stop taking it until you talk to your cardiologist.        If you are on Metformin (Glucophage), do not restart it until you have blood tests (within 2 to 3 days after discharge).  After you have your blood drawn, you may restart the Metformin.     Take your medications, including blood thinners, unless your provider tells you not to.  If you take Coumadin (Warfarin), have your INR checked by your provider in  3-5 days. Call your clinic to schedule this.    If you have stopped any medicines, check with your provider about when to restart them.    Follow Up Appointments:      Follow up with [facility] Nurse Practitioner at [facility] of patient preference in 7-10 days.    Call the clinic if:      You have a large or growing hard lump around the site.    The site is red, swollen, hot or tender.    Blood or fluid is draining from the site.    You have chills or a fever greater than 101 F (38 C).    Your arm feels numb, cool or changes color.    You have hives, a rash or unusual itching.    Any questions or concerns.          [facility] [place]:    [phone] [facility] (7 days a week)

## 2018-07-19 ENCOUNTER — TELEPHONE (OUTPATIENT)
Dept: CARDIOLOGY | Facility: CLINIC | Age: 63
End: 2018-07-19

## 2018-07-19 NOTE — LETTER
[facility]  [address]  [place] [address]  [phone]          July 19, 2018    RE:  Ronak Malave                                                                                                                                                       [address] [facility] [place] [facility] [address]            Mr. [employer]:    Ronak Malave is under my professional care for her heart condition from 7/16/18 to present.   She  may return to work as long as she feels well effective today.  If she has any cardiac symptoms such as fatigue or shortness of breath she will need time off of work to rest.      Please call with any questions or concerns.        Sincerely,        Jennifer Mccarthy PA-C  [facility]  Phone: [phone]

## 2018-07-19 NOTE — TELEPHONE ENCOUNTER
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  She states she feels well enough to go back to work.  Always low impact, no lifting.  Discussed she can go back to this work.  Employer asking for a letter.  Will be faxed to [employer] [phone], copy mailed to pt.  Jennifer Mccarthy PA-C 7/19/2018 2:44 PM

## 2018-07-24 ENCOUNTER — OFFICE VISIT (OUTPATIENT)
Dept: CARDIOLOGY | Facility: CLINIC | Age: 63
End: 2018-07-24

## 2018-07-24 VITALS
BODY MASS INDEX: 23.61 KG/M2 | WEIGHT: 184 LBS | HEART RATE: 69 BPM | SYSTOLIC BLOOD PRESSURE: 117 MMHG | DIASTOLIC BLOOD PRESSURE: 74 MMHG | HEIGHT: 74 IN

## 2018-07-24 DIAGNOSIS — I21.29 ST ELEVATION MYOCARDIAL INFARCTION (STEMI) INVOLVING OTHER CORONARY ARTERY (H): ICD-10-CM

## 2018-07-24 DIAGNOSIS — D58.2 ELEVATED HEMOGLOBIN (H): ICD-10-CM

## 2018-07-24 DIAGNOSIS — I48.0 PAROXYSMAL A-FIB (H): Primary | ICD-10-CM

## 2018-07-24 LAB
ANION GAP SERPL CALCULATED.3IONS-SCNC: 12.7 MMOL/L (ref 6–17)
BUN SERPL-MCNC: 18 MG/DL (ref 7–30)
CALCIUM SERPL-MCNC: 9.5 MG/DL (ref 8.5–10.5)
CHLORIDE SERPL-SCNC: 101 MMOL/L (ref 98–107)
CO2 SERPL-SCNC: 28 MMOL/L (ref 23–29)
CREAT SERPL-MCNC: 1.12 MG/DL (ref 0.7–1.3)
ERYTHROCYTE [DISTWIDTH] IN BLOOD BY AUTOMATED COUNT: 13.5 % (ref 10–15)
GFR SERPL CREATININE-BSD FRML MDRD: 49 ML/MIN/1.7M2
GLUCOSE SERPL-MCNC: 105 MG/DL (ref 70–105)
HCT VFR BLD AUTO: 50.2 % (ref 35–47)
HGB BLD-MCNC: 16.7 G/DL (ref 11.7–15.7)
MCH RBC QN AUTO: 30.1 PG (ref 26.5–33)
MCHC RBC AUTO-ENTMCNC: 33.3 G/DL (ref 31.5–36.5)
MCV RBC AUTO: 91 FL (ref 78–100)
PLATELET # BLD AUTO: 309 10E9/L (ref 150–450)
POTASSIUM SERPL-SCNC: 4.7 MMOL/L (ref 3.5–5.1)
RBC # BLD AUTO: 5.54 10E12/L (ref 3.8–5.2)
SODIUM SERPL-SCNC: 137 MMOL/L (ref 136–145)
WBC # BLD AUTO: 8 10E9/L (ref 4–11)

## 2018-07-24 PROCEDURE — 99214 OFFICE O/P EST MOD 30 MIN: CPT | Performed by: PHYSICIAN ASSISTANT

## 2018-07-24 PROCEDURE — 36415 COLL VENOUS BLD VENIPUNCTURE: CPT | Performed by: PHYSICIAN ASSISTANT

## 2018-07-24 PROCEDURE — 85027 COMPLETE CBC AUTOMATED: CPT | Performed by: PHYSICIAN ASSISTANT

## 2018-07-24 PROCEDURE — 80048 BASIC METABOLIC PNL TOTAL CA: CPT | Performed by: PHYSICIAN ASSISTANT

## 2018-07-24 NOTE — PROGRESS NOTES
[id number]  HPI and Plan:   See dictation    Orders this Visit:  Orders Placed This Encounter   Procedures     Lipid Profile     ALT     Basic metabolic panel     Follow-Up with Cardiologist     Follow-Up with Cardiac Advanced Practice Provider     Holter Monitor 24 hour - Adult     No orders of the defined types were placed in this encounter.    There are no discontinued medications.      Encounter Diagnoses   Name Primary?     ST elevation myocardial infarction (STEMI) involving other coronary artery (H)      Paroxysmal a-fib (H) Yes       CURRENT MEDICATIONS:  Current Outpatient Prescriptions   Medication Sig Dispense Refill     aspirin 81 MG EC tablet Take 1 tablet (81 mg) by mouth daily 100 tablet 3     atorvastatin (LIPITOR) 80 MG tablet Take 1 tablet (80 mg) by mouth every evening 90 tablet 3     carvedilol (COREG) 3.125 MG tablet Take 1 tablet (3.125 mg) by mouth 2 times daily (with meals) 180 tablet 3     clopidogrel (PLAVIX) 75 MG tablet Take 1 tablet (75 mg) by mouth daily 90 tablet 3     FINASTERIDE PO Take 5 mg by mouth daily        glucosamine-chondroitin 500-400 MG CAPS Take 1 capsule by mouth daily       Multiple Vitamins-Minerals (HAIR SKIN AND NAILS FORMULA PO) Take 2 capsules by mouth daily       nitroGLYcerin (NITROSTAT) 0.4 MG sublingual tablet For chest pain place 1 tablet under the tongue every 5 minutes for 3 doses. If symptoms persist 5 minutes after 1st dose call 911. 25 tablet 3     SPIRONOLACTONE PO Take 50 mg by mouth 2 times daily         ALLERGIES   No Known Allergies    PAST MEDICAL HISTORY:  No past medical history on file.    PAST SURGICAL HISTORY:  Past Surgical History:   Procedure Laterality Date     BREAST SURGERY       ORTHOPEDIC SURGERY         FAMILY HISTORY:  Family History   Problem Relation Age of Onset     Coronary Artery Disease Early Onset No family hx of        SOCIAL HISTORY:  Social History     Social History     Marital status: Single     Spouse name: N/A     Number of  "children: N/A     Years of education: N/A     Social History Main Topics     Smoking status: Never Smoker     Smokeless tobacco: Never Used     Alcohol use 0.6 oz/week     0 Standard drinks or equivalent, 1 Glasses of wine per week     Drug use: No     Sexual activity: Not Currently     Partners: Female      Comment: transwoman     Other Topics Concern     None     Social History Narrative       Review of Systems:  Skin:  Negative     Eyes:  Negative    ENT:  Negative    Respiratory:  Negative    Cardiovascular:  Negative    Gastroenterology: Negative    Genitourinary:  Negative    Musculoskeletal:  Negative    Neurologic:  Negative    Psychiatric:  Negative    Heme/Lymph/Imm:  Negative    Endocrine:  Negative      Physical Exam:  Vitals: /74  Pulse 69  Ht 1.88 m (6' 2\")  Wt 83.5 kg (184 lb)  BMI 23.62 kg/m2   Please refer to dictation for physical exam    Recent Lab Results:  LIPID RESULTS:  Lab Results   Component Value Date    CHOL 221 (H) 07/17/2018    HDL 58 07/17/2018     (H) 07/17/2018    TRIG 187 (H) 07/17/2018       LIVER ENZYME RESULTS:  Lab Results   Component Value Date    AST 28 01/22/2014    ALT 29 01/22/2014       CBC RESULTS:  Lab Results   Component Value Date    WBC 8.0 07/24/2018    RBC 5.54 (H) 07/24/2018    HGB 16.7 (H) 07/24/2018    HCT 50.2 (H) 07/24/2018    MCV 91 07/24/2018    MCH 30.1 07/24/2018    MCHC 33.3 07/24/2018    RDW 13.5 07/24/2018     07/24/2018       BMP RESULTS:  Lab Results   Component Value Date     07/24/2018    POTASSIUM 4.7 07/24/2018    CHLORIDE 101 07/24/2018    CO2 28 07/24/2018    ANIONGAP 12.7 07/24/2018     07/24/2018    BUN 18 07/24/2018    CR 1.12 07/24/2018    GFRESTIMATED 49 (L) 07/24/2018    GFRESTBLACK 59 (L) 07/24/2018    YVES 9.5 07/24/2018        A1C RESULTS:  No results found for: A1C    INR RESULTS:  Lab Results   Component Value Date    INR 0.88 07/16/2018    INR 1.01 01/23/2014           CC  No referring provider " defined for this encounter.

## 2018-07-24 NOTE — PROGRESS NOTES
Service Date: 07/24/2018      PRIMARY CARDIOLOGIST:  Dr. Nieto      REASON FOR VISIT:  STEMI followup.      HISTORY OF PRESENT ILLNESS:  Bola is a delightful 63-year-old female who has a past medical history significant for PE while on estrogen, male-to-female transgender, dyslipidemia managed with diet and exercise who presented with acute chest pain.  She was found to have an inferior STEMI and went emergently for coronary angiogram.  This demonstrated 100% thrombotic OM4 that was the distal part of the vessel.  This was managed medically, as the vessel was very small and distal.  She otherwise had a normal left main, mid LAD with 30% lesion, a D2 with an ostial 50% lesion, an RCA with minimal disease and a circ with a 50% ostial lesion.  Her troponins peaked at 26 and her echocardiogram came back with a low-normal EF with moderate apical wall hypokinesis but without significant valvular disease.  Her LVEDP was elevated at 29 at the time of angiogram.  On telemetry, she did have a brief run of atrial tachycardia that was not long enough to qualify as AFib but it had to be more than 30 seconds.  This was about 14 seconds and this was asymptomatic.  Her hemoglobin was noted to be mildly elevated at 16.7 and this is rechecked today.      Since being discharged, she feels well.  She denies chest pain, shortness of breath, orthopnea or PND.  She has not felt particularly fatigued.  She has cut back on her work schedule, so she has some more down time.  She is sleeping fine.  No additional chest pain.  Her wrist has healed fine.      SOCIAL HISTORY:  She works with disabled adults and homeless youth.  She also works as a drummer, so she will mostly work all day long and then most evenings as well, somewhere along 14-16 hour days.  She has now taken time off from her drumming to give herself some rest.  She is a lifelong nonsmoker, about 1 drink of alcohol a week.  No drug use.      PHYSICAL EXAMINATION:   GENERAL:   Reveals a well-developed, well-nourished female in no acute distress.   HEENT:  Normocephalic, atraumatic.   HEART:  Regular in rate and rhythm without murmur, rub or gallop.   LUNGS:  Clear bilaterally.  Carotids are without bruit.   EXTREMITIES:  Right vascular access site is clean, dry, intact and well healed with 2+ radial pulse, some mild area of ecchymosis.  No peripheral edema.   SKIN:  Warm and dry.      ASSESSMENT AND PLAN:   1.  Coronary artery disease with recent inferior STEMI that was medically managed due to the OM4 being very small and distal.  She will be managed with dual antiplatelet therapy though for 1 year.  This will be aspirin and Plavix at a minimum.  She does have other nonocclusive coronary artery disease as detailed as above.  We will use medical management for that as well.  It is most likely that her STEMI resulted from plaque rupture typical for coronary artery disease but with her elevated hemoglobin, I do have a slight concern for polycythemia vera.  We discussed options of getting this evaluated by Hematology versus her primary care provider.  At this point, she would like to discuss this with Dr. Shelley Lange to evaluate further.  I do note that previously, her hemoglobin has been normal in the past.   2.  Dyslipidemia.  Goal LDL now will be less than 70.  She was started on high-dose Lipitor at 80 mg.  She is to be on this for both lipid-lowering and plaque stabilization.  We will repeat lipid panel in 3 months.   3.  Hypertensive in the hospital, now well controlled with just low-dose carvedilol 3.125 mg b.i.d.  She was initially on lisinopril as well as in the hospital for the STEMI but her blood pressures were soft in the 90s and this was discontinued, both for risk of hypertension and hyperkalemia along with her spironolactone.   4.  History of PE while she was on estrogen, now off estrogen and will remain off.   5.  Brief run of paroxysmal atrial tachycardia versus AFib in the  hospital.  This was 14 beats, not long enough to be considered true AFib which needs to then be 30 seconds.  We will get a 24-hour Holter monitor to assess.   6.  Uninsured during hospitalization.  Financial office was contacted.  The coverage is in the works and it is my understanding that it starts after .   7.  Male-to-female transgender.  We will continue spironolactone and finasteride.  Both are fine with her cardiac conditions.      I will see her back in 1 month and she will see Dr. Nieto back in 3 months.  We will do a Holter monitor when she is insured.  Will see her back sooner with concerns.  Thank you for allowing me to participate in her care.      Jennifer Ward PA-C       cc:   Shelley Lange MD    [facility] [address] [place] [address]         JENNIFER WARD PA-C             D: 2018   T: 2018   MT: [id number]      Name:     KANNAN BELLO   MRN:      [id number]        Account:      [id number]   :      1955           Service Date: 2018      Document: [id number]

## 2018-07-24 NOTE — MR AVS SNAPSHOT
After Visit Summary   7/24/2018    Ronak Malave    MRN: [id number]           Patient Information     Date Of Birth          1955        Visit Information        Provider Department      7/24/2018 10:30 AM More, Jennifer Padilla PA-C [facility]   [place]        Today's Diagnoses     Paroxysmal a-fib (H)    -  1    ST elevation myocardial infarction (STEMI) involving other coronary artery (H)          Care Instructions    Thanks for coming into [facility] clinic today.    We discussed: I'm glad you are feeling well.  Your lifting restrictions are done, but please continue to take care of yourself and not work too hard.      Please talk to Shelley Lange PA-C about your blood count.  I'm wondering if you have polycythemia vera.      We'll have you wear a monitor to make sure you're not having an irregular heart rhythm.       Medication changes:  Continue current medications.      Results: Kidney function is normal, Blood count is a little bit high.    Component      Latest Ref Rng & Units 7/24/2018   Sodium      136 - 145 mmol/L 137   Potassium      3.5 - 5.1 mmol/L 4.7   Chloride      98 - 107 mmol/L 101   Carbon Dioxide      23 - 29 mmol/L 28   Anion Gap      6 - 17 mmol/L 12.7   Glucose      70 - 105 mg/dL 105   Urea Nitrogen      7 - 30 mg/dL 18   Creatinine      0.70 - 1.30 mg/dL 1.12   GFR Estimate      >60 mL/min/1.7m2 49 (L)   GFR Estimate If Black      >60 mL/min/1.7m2 59 (L)   Calcium      8.5 - 10.5 mg/dL 9.5   WBC      4.0 - 11.0 10e9/L 8.0   RBC Count      3.8 - 5.2 10e12/L 5.54 (H)   Hemoglobin      11.7 - 15.7 g/dL 16.7 (H)   Hematocrit      35.0 - 47.0 % 50.2 (H)   MCV      78 - 100 fl 91   MCH      26.5 - 33.0 pg 30.1   MCHC      31.5 - 36.5 g/dL 33.3   RDW      10.0 - 15.0 % 13.5   Platelet Count      150 - 450 10e9/L 309     Follow up: with me in about 1 month.        Please call my nurse at [phone] with any questions or  concerns.    Scheduling phone number: [phone]  Reminder: Please bring in all current medications, over the counter supplements and vitamin bottles to your next appointment.                  Follow-ups after your visit        Additional Services     Follow-Up with Cardiac Advanced Practice Provider           Follow-Up with Cardiologist                 Your next 10 appointments already scheduled     Jul 26, 2018 10:00 AM CDT   Cardiac Evaluation with  Cardiac Rehab 1   [facility] ([facility])    [address], [address]  [place] [id number] [phone]              Future tests that were ordered for you today     Open Future Orders        Priority Expected Expires Ordered    Follow-Up with Cardiac Advanced Practice Provider Routine 8/23/2018 7/24/2019 7/24/2018    Holter Monitor 24 hour - Adult Routine 7/31/2018 7/24/2019 7/24/2018    Follow-Up with Cardiologist Routine 10/22/2018 7/24/2019 7/24/2018    Lipid Profile Routine 10/22/2018 7/24/2019 7/24/2018    ALT Routine 10/22/2018 7/24/2019 7/24/2018    Basic metabolic panel Routine 10/22/2018 7/24/2019 7/24/2018            Who to contact     If you have questions or need follow up information about today's clinic visit or your schedule please contact [facility]   [place] directly at [phone].  Normal or non-critical lab and imaging results will be communicated to you by MyChart, letter or phone within 4 business days after the clinic has received the results. If you do not hear from us within 7 days, please contact the clinic through MyChart or phone. If you have a critical or abnormal lab result, we will notify you by phone as soon as possible.  Submit refill requests through [employer] or call your pharmacy and they will forward the refill request to us. Please allow 3 business days for your refill to be completed.          Additional Information About Your Visit        MyChart  "Information     [employer] lets you send messages to your doctor, view your test results, renew your prescriptions, schedule appointments and more. To sign up, go to [email or web address] . Click on \"Log in\" on the left side of the screen, which will take you to the Welcome page. Then click on \"Sign up Now\" on the right side of the page.     You will be asked to enter the access code listed below, as well as some personal information. Please follow the directions to create your username and password.     Your access code is: [email or web address]  Expires: 10/16/2018 10:42 AM     Your access code will  in 90 days. If you need help or a new code, please call your [facility] clinic or [phone].        Care EveryWhere ID     This is your [facility] EveryWhere ID. This could be used by other organizations to access your [facility] medical records  [id number]        Your Vitals Were     Pulse Height BMI (Body Mass Index)             69 1.88 m (6' 2\") 23.62 kg/m2          Blood Pressure from Last 3 Encounters:   18 117/74   18 95/56   17 118/72    Weight from Last 3 Encounters:   18 83.5 kg (184 lb)   18 84.1 kg (185 lb 6.4 oz)   17 86 kg (189 lb 8 oz)              We Performed the Following     Follow-Up with Cardiac Advanced Practice Provider        Primary Care Provider Office Phone # Fax #    [facility] [phone]        [address] [place]  [facility] [address]        Equal Access to Services     AWA BUSCH : Hadii annetta Matthews, waaxda luqadaha, qaybta kaaljosefa duncan. So [facility] [phone].    ATENCIÓN: Si habla español, tiene a kelly disposición servicios gratuitos de asistencia lingüística. Derek al [phone].    We comply with applicable federal civil rights laws and [place] laws. We do not discriminate on the basis of race, color, national origin, age, disability, sex, sexual orientation, " or gender identity.            Thank you!     Thank you for choosing [facility]  for your care. Our goal is always to provide you with excellent care. Hearing back from our patients is one way we can continue to improve our services. Please take a few minutes to complete the written survey that you may receive in the mail after your visit with us. Thank you!             Your Updated Medication List - Protect others around you: Learn how to safely use, store and throw away your medicines at www.disposemymeds.org.          This list is accurate as of 7/24/18 11:05 AM.  Always use your most recent med list.                   Brand Name Dispense Instructions for use Diagnosis    aspirin 81 MG EC tablet     100 tablet    Take 1 tablet (81 mg) by mouth daily    ST elevation myocardial infarction (STEMI) involving other coronary artery (H)       atorvastatin 80 MG tablet    LIPITOR    90 tablet    Take 1 tablet (80 mg) by mouth every evening    Hyperlipidemia LDL goal <70       carvedilol 3.125 MG tablet    COREG    180 tablet    Take 1 tablet (3.125 mg) by mouth 2 times daily (with meals)    ST elevation myocardial infarction (STEMI) involving other coronary artery (H)       clopidogrel 75 MG tablet    PLAVIX    90 tablet    Take 1 tablet (75 mg) by mouth daily    ST elevation myocardial infarction (STEMI) involving other coronary artery (H)       FINASTERIDE PO      Take 5 mg by mouth daily        glucosamine-chondroitin 500-400 MG Caps per capsule      Take 1 capsule by mouth daily        HAIR SKIN AND NAILS FORMULA PO      Take 2 capsules by mouth daily        nitroGLYcerin 0.4 MG sublingual tablet    NITROSTAT    25 tablet    For chest pain place 1 tablet under the tongue every 5 minutes for 3 doses. If symptoms persist 5 minutes after 1st dose call 911.    ST elevation myocardial infarction (STEMI) involving other coronary artery (H)       SPIRONOLACTONE PO      Take 50 mg by  mouth 2 times daily

## 2018-07-24 NOTE — PROGRESS NOTES
Reviewed during clinic visit.  Please see progress note for plan.  Jennifer Mccarthy PA-C 7/24/2018 12:13 PM

## 2018-07-24 NOTE — LETTER
7/24/2018    [facility]  [address] [place]  [place] [address]    RE: Ronak Malave       Dear Colleague,    I had the pleasure of seeing Ronak Malave in the [facility].    [id number]  HPI and Plan:   See dictation    Orders this Visit:  Orders Placed This Encounter   Procedures     Lipid Profile     ALT     Basic metabolic panel     Follow-Up with Cardiologist     Follow-Up with Cardiac Advanced Practice Provider     Holter Monitor 24 hour - Adult     No orders of the defined types were placed in this encounter.    There are no discontinued medications.      Encounter Diagnoses   Name Primary?     ST elevation myocardial infarction (STEMI) involving other coronary artery (H)      Paroxysmal a-fib (H) Yes       CURRENT MEDICATIONS:  Current Outpatient Prescriptions   Medication Sig Dispense Refill     aspirin 81 MG EC tablet Take 1 tablet (81 mg) by mouth daily 100 tablet 3     atorvastatin (LIPITOR) 80 MG tablet Take 1 tablet (80 mg) by mouth every evening 90 tablet 3     carvedilol (COREG) 3.125 MG tablet Take 1 tablet (3.125 mg) by mouth 2 times daily (with meals) 180 tablet 3     clopidogrel (PLAVIX) 75 MG tablet Take 1 tablet (75 mg) by mouth daily 90 tablet 3     FINASTERIDE PO Take 5 mg by mouth daily        glucosamine-chondroitin 500-400 MG CAPS Take 1 capsule by mouth daily       Multiple Vitamins-Minerals (HAIR SKIN AND NAILS FORMULA PO) Take 2 capsules by mouth daily       nitroGLYcerin (NITROSTAT) 0.4 MG sublingual tablet For chest pain place 1 tablet under the tongue every 5 minutes for 3 doses. If symptoms persist 5 minutes after 1st dose call 911. 25 tablet 3     SPIRONOLACTONE PO Take 50 mg by mouth 2 times daily         ALLERGIES   No Known Allergies    PAST MEDICAL HISTORY:  No past medical history on file.    PAST SURGICAL HISTORY:  Past Surgical History:   Procedure Laterality Date     BREAST SURGERY       ORTHOPEDIC SURGERY    "      FAMILY HISTORY:  Family History   Problem Relation Age of Onset     Coronary Artery Disease Early Onset No family hx of        SOCIAL HISTORY:  Social History     Social History     Marital status: Single     Spouse name: N/A     Number of children: N/A     Years of education: N/A     Social History Main Topics     Smoking status: Never Smoker     Smokeless tobacco: Never Used     Alcohol use 0.6 oz/week     0 Standard drinks or equivalent, 1 Glasses of wine per week     Drug use: No     Sexual activity: Not Currently     Partners: Female      Comment: transwoman     Other Topics Concern     None     Social History Narrative       Review of Systems:  Skin:  Negative     Eyes:  Negative    ENT:  Negative    Respiratory:  Negative    Cardiovascular:  Negative    Gastroenterology: Negative    Genitourinary:  Negative    Musculoskeletal:  Negative    Neurologic:  Negative    Psychiatric:  Negative    Heme/Lymph/Imm:  Negative    Endocrine:  Negative      Physical Exam:  Vitals: /74  Pulse 69  Ht 1.88 m (6' 2\")  Wt 83.5 kg (184 lb)  BMI 23.62 kg/m2   Please refer to dictation for physical exam    Recent Lab Results:  LIPID RESULTS:  Lab Results   Component Value Date    CHOL 221 (H) 07/17/2018    HDL 58 07/17/2018     (H) 07/17/2018    TRIG 187 (H) 07/17/2018       LIVER ENZYME RESULTS:  Lab Results   Component Value Date    AST 28 01/22/2014    ALT 29 01/22/2014       CBC RESULTS:  Lab Results   Component Value Date    WBC 8.0 07/24/2018    RBC 5.54 (H) 07/24/2018    HGB 16.7 (H) 07/24/2018    HCT 50.2 (H) 07/24/2018    MCV 91 07/24/2018    MCH 30.1 07/24/2018    MCHC 33.3 07/24/2018    RDW 13.5 07/24/2018     07/24/2018       BMP RESULTS:  Lab Results   Component Value Date     07/24/2018    POTASSIUM 4.7 07/24/2018    CHLORIDE 101 07/24/2018    CO2 28 07/24/2018    ANIONGAP 12.7 07/24/2018     07/24/2018    BUN 18 07/24/2018    CR 1.12 07/24/2018    GFRESTIMATED 49 (L) " 07/24/2018    GFRESTBLACK 59 (L) 07/24/2018    YVES 9.5 07/24/2018        A1C RESULTS:  No results found for: A1C    INR RESULTS:  Lab Results   Component Value Date    INR 0.88 07/16/2018    INR 1.01 01/23/2014           CC  No referring provider defined for this encounter.        Thank you for allowing me to participate in the care of your patient.      Sincerely,     Jennifer Mccarthy PA-C     [facility]    cc:   No referring provider defined for this encounter.

## 2018-07-24 NOTE — PATIENT INSTRUCTIONS
Thanks for coming into [facility] clinic today.    We discussed: I'm glad you are feeling well.  Your lifting restrictions are done, but please continue to take care of yourself and not work too hard.      Please talk to Shelley Lange PA-C about your blood count.  I'm wondering if you have polycythemia vera.      We'll have you wear a monitor to make sure you're not having an irregular heart rhythm.       Medication changes:  Continue current medications.      Results: Kidney function is normal, Blood count is a little bit high.    Component      Latest Ref Rng & Units 7/24/2018   Sodium      136 - 145 mmol/L 137   Potassium      3.5 - 5.1 mmol/L 4.7   Chloride      98 - 107 mmol/L 101   Carbon Dioxide      23 - 29 mmol/L 28   Anion Gap      6 - 17 mmol/L 12.7   Glucose      70 - 105 mg/dL 105   Urea Nitrogen      7 - 30 mg/dL 18   Creatinine      0.70 - 1.30 mg/dL 1.12   GFR Estimate      >60 mL/min/1.7m2 49 (L)   GFR Estimate If Black      >60 mL/min/1.7m2 59 (L)   Calcium      8.5 - 10.5 mg/dL 9.5   WBC      4.0 - 11.0 10e9/L 8.0   RBC Count      3.8 - 5.2 10e12/L 5.54 (H)   Hemoglobin      11.7 - 15.7 g/dL 16.7 (H)   Hematocrit      35.0 - 47.0 % 50.2 (H)   MCV      78 - 100 fl 91   MCH      26.5 - 33.0 pg 30.1   MCHC      31.5 - 36.5 g/dL 33.3   RDW      10.0 - 15.0 % 13.5   Platelet Count      150 - 450 10e9/L 309     Follow up: with me in about 1 month.        Please call my nurse at [phone] with any questions or concerns.    Scheduling phone number: [phone]  Reminder: Please bring in all current medications, over the counter supplements and vitamin bottles to your next appointment.

## 2018-07-24 NOTE — LETTER
7/24/2018    Shelley Lange MD    [facility] [address] [place] [address]     RE: Ronak Malave       Dear Colleague,    I had the pleasure of seeing Ronak Malave in the [facility].    Service Date: 07/24/2018      PRIMARY CARDIOLOGIST:  Dr. Nieto      REASON FOR VISIT:  STEMI followup.      HISTORY OF PRESENT ILLNESS:  Bola is a delightful 63-year-old female who has a past medical history significant for PE while on estrogen, male-to-female transgender, dyslipidemia managed with diet and exercise who presented with acute chest pain.  She was found to have an inferior STEMI and went emergently for coronary angiogram.  This demonstrated 100% thrombotic OM4 that was the distal part of the vessel.  This was managed medically, as the vessel was very small and distal.  She otherwise had a normal left main, mid LAD with 30% lesion, a D2 with an ostial 50% lesion, an RCA with minimal disease and a circ with a 50% ostial lesion.  Her troponins peaked at 26 and her echocardiogram came back with a low-normal EF with moderate apical wall hypokinesis but without significant valvular disease.  Her LVEDP was elevated at 29 at the time of angiogram.  On telemetry, she did have a brief run of atrial tachycardia that was not long enough to qualify as AFib but it had to be more than 30 seconds.  This was about 14 seconds and this was asymptomatic.  Her hemoglobin was noted to be mildly elevated at 16.7 and this is rechecked today.      Since being discharged, she feels well.  She denies chest pain, shortness of breath, orthopnea or PND.  She has not felt particularly fatigued.  She has cut back on her work schedule, so she has some more down time.  She is sleeping fine.  No additional chest pain.  Her wrist has healed fine.      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  She has now taken time off from her drumming to give herself some rest.  She is a lifelong nonsmoker, about 1 drink of alcohol a week.  No drug use.      PHYSICAL EXAMINATION:   GENERAL:  Reveals a well-developed, well-nourished female in no acute distress.   HEENT:  Normocephalic, atraumatic.   HEART:  Regular in rate and rhythm without murmur, rub or gallop.   LUNGS:  Clear bilaterally.  Carotids are without bruit.   EXTREMITIES:  Right vascular access site is clean, dry, intact and well healed with 2+ radial pulse, some mild area of ecchymosis.  No peripheral edema.   SKIN:  Warm and dry.      ASSESSMENT AND PLAN:   1.  Coronary artery disease with recent inferior STEMI that was medically managed due to the OM4 being very small and distal.  She will be managed with dual antiplatelet therapy though for 1 year.  This will be aspirin and Plavix at a minimum.  She does have other nonocclusive coronary artery disease as detailed as above.  We will use medical management for that as well.  It is most likely that her STEMI resulted from plaque rupture typical for coronary artery disease but with her elevated hemoglobin, I do have a slight concern for polycythemia vera.  We discussed options of getting this evaluated by Hematology versus her primary care provider.  At this point, she would like to discuss this with Dr. Shelley Lange to evaluate further.  I do note that previously, her hemoglobin has been normal in the past.   2.  Dyslipidemia.  Goal LDL now will be less than 70.  She was started on high-dose Lipitor at 80 mg.  She is to be on this for both lipid-lowering and plaque stabilization.  We will repeat lipid panel in 3 months.   3.  Hypertensive in the hospital, now well controlled with just low-dose carvedilol 3.125 mg b.i.d.  She was initially on lisinopril as well as in the hospital for the STEMI but her blood pressures were soft in the 90s and this  was discontinued, both for risk of hypertension and hyperkalemia along with her spironolactone.   4.  History of PE while she was on estrogen, now off estrogen and will remain off.   5.  Brief run of paroxysmal atrial tachycardia versus AFib in the hospital.  This was 14 beats, not long enough to be considered true AFib which needs to then be 30 seconds.  We will get a 24-hour Holter monitor to assess.   6.  Uninsured during hospitalization.  Financial office was contacted.  The coverage is in the works and it is my understanding that it starts after .   7.  Male-to-female transgender.  We will continue spironolactone and finasteride.  Both are fine with her cardiac conditions.      I will see her back in 1 month and she will see Dr. Nieto back in 3 months.  We will do a Holter monitor when she is insured.  Will see her back sooner with concerns.  Thank you for allowing me to participate in her care.      Jennifer Ward PA-C       cc:   Shelley Lange MD    [facility] [address] [place] [address]         JENNIFER WARD PA-C             D: 2018   T: 2018   MT: [id number]      Name:     KANNAN BELLO   MRN:      [id number]        Account:      [id number]   :      1955           Service Date: 2018      Document: [id number]         Outpatient Encounter Prescriptions as of 2018   Medication Sig Dispense Refill     aspirin 81 MG EC tablet Take 1 tablet (81 mg) by mouth daily 100 tablet 3     atorvastatin (LIPITOR) 80 MG tablet Take 1 tablet (80 mg) by mouth every evening 90 tablet 3     carvedilol (COREG) 3.125 MG tablet Take 1 tablet (3.125 mg) by mouth 2 times daily (with meals) 180 tablet 3     clopidogrel (PLAVIX) 75 MG tablet Take 1 tablet (75 mg) by mouth daily 90 tablet 3     FINASTERIDE PO Take 5 mg by mouth daily        glucosamine-chondroitin 500-400 MG CAPS Take 1 capsule by mouth daily       Multiple Vitamins-Minerals (HAIR SKIN AND NAILS FORMULA  PO) Take 2 capsules by mouth daily       nitroGLYcerin (NITROSTAT) 0.4 MG sublingual tablet For chest pain place 1 tablet under the tongue every 5 minutes for 3 doses. If symptoms persist 5 minutes after 1st dose call 911. 25 tablet 3     SPIRONOLACTONE PO Take 50 mg by mouth 2 times daily       No facility-administered encounter medications on file as of 7/24/2018.        Again, thank you for allowing me to participate in the care of your patient.      Sincerely,    Jennifer Mccarthy PA-C     [facility]

## 2018-07-26 ENCOUNTER — HOSPITAL ENCOUNTER (OUTPATIENT)
Dept: CARDIAC REHAB | Facility: CLINIC | Age: 63
End: 2018-07-26
Attending: PHYSICIAN ASSISTANT

## 2018-07-26 ENCOUNTER — TELEPHONE (OUTPATIENT)
Dept: CARDIOLOGY | Facility: CLINIC | Age: 63
End: 2018-07-26

## 2018-07-26 DIAGNOSIS — I21.3 ST ELEVATION MYOCARDIAL INFARCTION (STEMI) (H): Primary | ICD-10-CM

## 2018-07-26 PROCEDURE — 93798 PHYS/QHP OP CAR RHAB W/ECG: CPT | Performed by: OCCUPATIONAL THERAPIST

## 2018-07-26 PROCEDURE — 40000116 ZZH STATISTIC OP CR VISIT: Performed by: OCCUPATIONAL THERAPIST

## 2018-07-26 PROCEDURE — 93797 PHYS/QHP OP CAR RHAB WO ECG: CPT | Performed by: OCCUPATIONAL THERAPIST

## 2018-07-26 PROCEDURE — 40000575 ZZH STATISTIC OP CARDIAC VISIT #2: Performed by: OCCUPATIONAL THERAPIST

## 2018-08-29 ENCOUNTER — OFFICE VISIT (OUTPATIENT)
Dept: CARDIOLOGY | Facility: CLINIC | Age: 63
End: 2018-08-29
Attending: PHYSICIAN ASSISTANT

## 2018-08-29 VITALS
DIASTOLIC BLOOD PRESSURE: 80 MMHG | SYSTOLIC BLOOD PRESSURE: 124 MMHG | BODY MASS INDEX: 23.49 KG/M2 | HEIGHT: 74 IN | WEIGHT: 183 LBS | HEART RATE: 70 BPM

## 2018-08-29 DIAGNOSIS — E78.5 HYPERLIPIDEMIA LDL GOAL <70: ICD-10-CM

## 2018-08-29 DIAGNOSIS — I21.29 ST ELEVATION MYOCARDIAL INFARCTION (STEMI) INVOLVING OTHER CORONARY ARTERY (H): ICD-10-CM

## 2018-08-29 DIAGNOSIS — I10 BENIGN ESSENTIAL HTN: Primary | ICD-10-CM

## 2018-08-29 PROCEDURE — 99214 OFFICE O/P EST MOD 30 MIN: CPT | Performed by: PHYSICIAN ASSISTANT

## 2018-08-29 NOTE — MR AVS SNAPSHOT
After Visit Summary   8/29/2018    Ronak Malave    MRN: [id number]           Patient Information     Date Of Birth          1955        Visit Information        Provider Department      8/29/2018 2:30 PM More, Jennifer Padilla PA-C [facility]        Today's Diagnoses     Benign essential HTN    -  1    ST elevation myocardial infarction (STEMI) involving other coronary artery (H)        Hyperlipidemia LDL goal <70          Care Instructions    Thanks for coming into [facility] clinic today.    We discussed: I'm glad things are going well!  Enjoy your shows this fall!    Medication changes:  Continue your current medication.      Follow up: as scheduled with Dr. Nieto.  When you get insurance, please see Shelley Lange PA-C and Dr. Jackson.        Please call my nurse at [phone] with any questions or concerns.    Scheduling phone number: [phone]  Reminder: Please bring in all current medications, over the counter supplements and vitamin bottles to your next appointment.                Follow-ups after your visit        Your next 10 appointments already scheduled     Nov 09, 2018  9:00 AM CST   LAB with [facility]   [facility] [place] ([facility])    [address] [place] [id number] [phone]           Please do not eat 10-12 hours before your appointment if you are coming in fasting for labs on lipids, cholesterol, or glucose (sugar). This does not apply to pregnant women. Water, hot tea and black coffee (with nothing added) are okay. Do not drink other fluids, diet soda or chew gum.            Nov 09, 2018  9:45 AM CST   Return Visit with Armen Nieto MD   [facility] ([facility])    [address] [place] [id number] [phone] OPT 2              Who to contact     If you have questions or need  "follow up information about today's clinic visit or your schedule please contact [facility] directly at [phone].  Normal or non-critical lab and imaging results will be communicated to you by MyChart, letter or phone within 4 business days after the clinic has received the results. If you do not hear from us within 7 days, please contact the clinic through MyChart or phone. If you have a critical or abnormal lab result, we will notify you by phone as soon as possible.  Submit refill requests through Share[employer] or call your pharmacy and they will forward the refill request to us. Please allow 3 business days for your refill to be completed.          Additional Information About Your Visit        Care EveryWhere ID     This is your Care EveryWhere ID. This could be used by other organizations to access your [facility] medical records  [id number]        Your Vitals Were     Pulse Height BMI (Body Mass Index)             70 1.88 m (6' 2\") 23.5 kg/m2          Blood Pressure from Last 3 Encounters:   08/29/18 124/80   07/24/18 117/74   07/18/18 95/56    Weight from Last 3 Encounters:   08/29/18 83 kg (183 lb)   07/24/18 83.5 kg (184 lb)   07/18/18 84.1 kg (185 lb 6.4 oz)              We Performed the Following     Follow-Up with Cardiac Advanced Practice Provider        Primary Care Provider Office Phone # Fax #    [facility] [phone]       [address] [place]  [facility] [address]        Equal Access to Services     AWA BUSCH : Hadii aad ku hadasho Soomaali, waaxda luqadaha, qaybta kaalmada adeegyada, josefa chavez . So [facility] [phone].    ATENCIÓN: Si habla español, tiene a kelly disposición servicios gratuitos de asistencia lingüística. Llame al [phone].    We comply with applicable federal civil rights laws and [place] laws. We do not discriminate on the basis of race, color, national origin, age, " disability, sex, sexual orientation, or gender identity.            Thank you!     Thank you for choosing [facility]  for your care. Our goal is always to provide you with excellent care. Hearing back from our patients is one way we can continue to improve our services. Please take a few minutes to complete the written survey that you may receive in the mail after your visit with us. Thank you!             Your Updated Medication List - Protect others around you: Learn how to safely use, store and throw away your medicines at www.disposemymeds.org.          This list is accurate as of 8/29/18  3:02 PM.  Always use your most recent med list.                   Brand Name Dispense Instructions for use Diagnosis    aspirin 81 MG EC tablet     100 tablet    Take 1 tablet (81 mg) by mouth daily    ST elevation myocardial infarction (STEMI) involving other coronary artery (H)       atorvastatin 80 MG tablet    LIPITOR    90 tablet    Take 1 tablet (80 mg) by mouth every evening    Hyperlipidemia LDL goal <70       carvedilol 3.125 MG tablet    COREG    180 tablet    Take 1 tablet (3.125 mg) by mouth 2 times daily (with meals)    ST elevation myocardial infarction (STEMI) involving other coronary artery (H)       clopidogrel 75 MG tablet    PLAVIX    90 tablet    Take 1 tablet (75 mg) by mouth daily    ST elevation myocardial infarction (STEMI) involving other coronary artery (H)       FINASTERIDE PO      Take 5 mg by mouth daily        glucosamine-chondroitin 500-400 MG Caps per capsule      Take 1 capsule by mouth daily        HAIR SKIN AND NAILS FORMULA PO      Take 2 capsules by mouth daily        nitroGLYcerin 0.4 MG sublingual tablet    NITROSTAT    25 tablet    For chest pain place 1 tablet under the tongue every 5 minutes for 3 doses. If symptoms persist 5 minutes after 1st dose call 911.    ST elevation myocardial infarction (STEMI) involving other coronary artery (H)        SPIRONOLACTONE PO      Take 50 mg by mouth 2 times daily

## 2018-08-29 NOTE — LETTER
8/29/2018      [facility]  [address]  [facility] [address]      RE: Ronak Malave       Dear Colleague,    I had the pleasure of seeing Ronak Malave in the [facility].    Service Date: 08/29/2018      PRIMARY CARDIOLOGIST:  Dr. Nieto.      REASON FOR VISIT:  STEMI followup.      HISTORY OF PRESENT ILLNESS:  Bola is a delightful 63-year-old woman who has a past medical history significant for PE while on estrogen, male to female transgender, dyslipidemia that had been managed with diet and exercise when she presented with acute chest pain.  She was found to have an inferior STEMI and went emergently for coronary angiogram.  This demonstrated 100% thrombotic OM4 that was in the distal part of the vessel.  As the vessel was very small and distal, it was managed medically.  She otherwise had a normal left main, mid LAD with a 30% lesion, D2 with an ostial 50% lesion, RCA with minimal disease, and a circ with 50% ostial lesions.  Her troponins peaked at 26.  Her echocardiogram came back with a low normal EF and moderate apical wall hypokinesis and no valvular disease.  During her stay, she had a brief run of atrial fibrillation, about 14 seconds that was asymptomatic.  Her hemoglobin was noted to be mildly elevated.  When I last saw her in clinic, she was feeling well.  We had planned on doing a Holter monitor to assess possible AFib burden.      She tells me she went to get her monitor put on and she still did not have insurance set up.  They were unable to tell her a cost, so she declined it.  She is still awaiting the results of whether or not she will be insured for both her hospitalization and going forward.  Outside of this tiny issue, she feels great.  She has no chest pain, shortness of breath, orthopnea or PND.  She has not had syncope or presyncope.  She has not had palpitations.  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  She has cut back and now typically only works evenings on the weekends, where previously she worked all day every day of the week and then evenings as well.  She is a lifelong nonsmoker, has never used drugs and about 1 alcoholic drink a week.      PHYSICAL EXAMINATION:   GENERAL:  A well-developed, well-nourished woman in no acute distress.   HEENT:  Normocephalic, atraumatic.   HEART:  Regular in rate and rhythm.  I do not appreciate murmur, rub or gallop.  Carotids are quiet.   LUNGS:  Clear without wheezes, rales or rhonchi.   EXTREMITIES:  Without peripheral edema.   SKIN:  Warm and dry.      ASSESSMENT AND PLAN:   1.  Coronary artery disease with inferior STEMI with an occluded OM4, managed medically given it was a small distal vessel.  Her EF is low normal around 50%-55%.  She is appropriately on aspirin and Plavix and will be on this for 1 year.  Fortunately, she has not had any recurrent symptoms and is able to go back to other activity.   2.  Elevated hemoglobin with me discussing the concern for polycythemia vera.  Again, she is deferring workup again until her insurance is figured out, which I think is reasonable.  It was only as high as 16.8 and not markedly elevated, I do not think something needs to be done immediately, but don't want it to fall through the cracks.   3.  Dyslipidemia, started on Lipitor 80, we will repeat lipid panel prior to visit with Dr. Nieto.   4.  History of PE while on estrogen.  She has been off estrogen since that time and will remain off.   5.  History of brief run of atrial tachycardia in the hospital.  Would still recommend a 24-hour Holter monitor to assess for AFib, as we certainly want to anticoagulate appropriately, she is postponing this due to financial concerns which I think is understandable.  Hopefully, those will be sorted out in the next few weeks and it can be reassessed.       We will have her follow up with Dr. Nieto in about 3 months with a lipid panel and BMP at that time, sooner with concerns.  I have encouraged her to reestablish care with her primary, Shelley Lange PA-C, as well as Dr. Jackson, who is her hormonal doctor.  I will be more than pleased to share care with her.      cc:   Missy Rankin MD   [facility] [address] [place] [address]      ALISA Mata   [facility] [address] [place] [address]         JENNIFER WARD PA-C             D: 2018   T: 2018   MT: VIRAL      Name:     KANNAN BELLO   MRN:      [id number]        Account:      [id number]   :      1955           Service Date: 2018      Document: [id number]           Outpatient Encounter Prescriptions as of 2018   Medication Sig Dispense Refill     aspirin 81 MG EC tablet Take 1 tablet (81 mg) by mouth daily 100 tablet 3     atorvastatin (LIPITOR) 80 MG tablet Take 1 tablet (80 mg) by mouth every evening 90 tablet 3     carvedilol (COREG) 3.125 MG tablet Take 1 tablet (3.125 mg) by mouth 2 times daily (with meals) 180 tablet 3     clopidogrel (PLAVIX) 75 MG tablet Take 1 tablet (75 mg) by mouth daily 90 tablet 3     FINASTERIDE PO Take 5 mg by mouth daily        glucosamine-chondroitin 500-400 MG CAPS Take 1 capsule by mouth daily       Multiple Vitamins-Minerals (HAIR SKIN AND NAILS FORMULA PO) Take 2 capsules by mouth daily       nitroGLYcerin (NITROSTAT) 0.4 MG sublingual tablet For chest pain place 1 tablet under the tongue every 5 minutes for 3 doses. If symptoms persist 5 minutes after 1st dose call 911. 25 tablet 3     SPIRONOLACTONE PO Take 50 mg by mouth 2 times daily       No facility-administered encounter medications on file as of 2018.        Again, thank you for allowing me to participate in the care of your patient.      Sincerely,    Jennifer Ward PA-C     [facility]  Care

## 2018-08-29 NOTE — LETTER
8/29/2018    [facility]  [address]  [facility] [address]    RE: Ronak Malave       Dear Colleague,    I had the pleasure of seeing Ronak Malave in the [facility].    [id number]    Thank you for allowing me to participate in the care of your patient.      Sincerely,     Jennifer Mccarthy PA-C     [facility]    cc:   Jennifer Mccarthy PA-C  [address]  [place] [address]

## 2018-08-29 NOTE — LETTER
8/29/2018      [facility]  [address]  [facility] [place] [address]      RE: Ronak Malave       Dear Colleague,    I had the pleasure of seeing Ronak Malave in the [facility].    Service Date: 08/29/2018      PRIMARY CARDIOLOGIST:  Dr. Nieto.      REASON FOR VISIT:  STEMI followup.      HISTORY OF PRESENT ILLNESS:  Magui*** is a delightful 63-year-old woman who has a past medical history significant for PE while on estrogen, male to female transgender, dyslipidemia that had been managed with diet and exercise when she presented with acute chest pain.  She was found to have an inferior STEMI and went emergently for coronary angiogram.  This demonstrated 100% thrombotic OM4 that was in the distal part of the vessel.  As the vessel was very small and distal, it was managed medically.  She otherwise had a normal left main, mid LAD with a 30% lesion, D2 with an ostial 50% lesion, RCA with minimal disease, and a circ with 50% ostial lesions.  Her troponins peaked at 26.  Her echocardiogram came back with a low normal EF and moderate apical wall hypokinesis and no valvular disease.  During her stay, she had a brief run of atrial fibrillation, about 14 seconds that was asymptomatic.  Her hemoglobin was noted to be mildly elevated.  When I last saw her in clinic, she was feeling well.  We had planned on doing a Holter monitor to assess possible AFib burden.      She tells me she went to get her monitor put on and she still did not have insurance set up.  They were unable to tell her a cost, so she declined it.  She is still awaiting the results of whether or not she will be insured for both her hospitalization and going forward.  Outside of this tiny issue, she feels great.  She has no chest pain, shortness of breath, orthopnea or PND.  She has not had syncope or presyncope.  She has not had palpitations.  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  She has cut back and now typically only works evenings on the weekends, where previously she worked all day every day of the week and then evenings as well.  She is a lifelong nonsmoker, has never used drugs and about 1 alcoholic drink a week.      PHYSICAL EXAMINATION:   GENERAL:  A well-developed, well-nourished woman in no acute distress.   HEENT:  Normocephalic, atraumatic.   HEART:  Regular in rate and rhythm.  I do not appreciate murmur, rub or gallop.  Carotids are quiet.   LUNGS:  Clear without wheezes, rales or rhonchi.   EXTREMITIES:  Without peripheral edema.   SKIN:  Warm and dry.      ASSESSMENT AND PLAN:   1.  Coronary artery disease with inferior STEMI with an occluded OM4, managed medically given it was a small distal vessel.  Her EF is low normal around 50%-55%.  She is appropriately on aspirin and Plavix and will be on this for 1 year.  Fortunately, she has not had any recurrent symptoms and is able to go back to other activity.   2.  Elevated hemoglobin with me discussing the concern for polycythemia vera.  Again, she is deferring workup again until her insurance is figured out, which I think is reasonable.  It was only as high as 16.8 and not markedly elevated, *** I think something needs to be done immediately.   3.  Dyslipidemia, started on Lipitor 80, we will repeat lipid panel prior to visit with Dr. Nieto.   4.  History of PE while on estrogen.  She has been off estrogen since that time and will remain off.   5.  History of brief run of atrial tachycardia in the hospital.  Would still recommend a 24-hour Holter monitor to assess for AFib, as we certainly want to anticoagulate appropriately, she is postponing this due to financial concerns which I think is understandable.  Hopefully, those will be sorted out in the next few weeks and it can be reassessed.      We will have her follow up with Dr. Nieto  in about 3 months with a lipid panel and BMP at that time, sooner with concerns.  I have encouraged her to reestablish care with her primary, Shelley Lange PA-C, as well as Dr. Jackson, who is her hormonal doctor.  I will be more than pleased to share care with her.      cc:   Missy Rankin MD   [facility] [address] [place] [address]       ALISA Mata   [facility] [address] [place] [address]         JENNIFER WARD PA-C             D: 2018   T: 2018   MT: VIRAL      Name:     KANNAN BELLO   MRN:      [id number]        Account:      [id number]   :      1955           Service Date: 2018      Document: [id number]         Outpatient Encounter Prescriptions as of 2018   Medication Sig Dispense Refill     aspirin 81 MG EC tablet Take 1 tablet (81 mg) by mouth daily 100 tablet 3     atorvastatin (LIPITOR) 80 MG tablet Take 1 tablet (80 mg) by mouth every evening 90 tablet 3     carvedilol (COREG) 3.125 MG tablet Take 1 tablet (3.125 mg) by mouth 2 times daily (with meals) 180 tablet 3     clopidogrel (PLAVIX) 75 MG tablet Take 1 tablet (75 mg) by mouth daily 90 tablet 3     FINASTERIDE PO Take 5 mg by mouth daily        glucosamine-chondroitin 500-400 MG CAPS Take 1 capsule by mouth daily       Multiple Vitamins-Minerals (HAIR SKIN AND NAILS FORMULA PO) Take 2 capsules by mouth daily       nitroGLYcerin (NITROSTAT) 0.4 MG sublingual tablet For chest pain place 1 tablet under the tongue every 5 minutes for 3 doses. If symptoms persist 5 minutes after 1st dose call 911. 25 tablet 3     SPIRONOLACTONE PO Take 50 mg by mouth 2 times daily       No facility-administered encounter medications on file as of 2018.        Again, thank you for allowing me to participate in the care of your patient.      Sincerely,    Jennifer Ward PA-C     [facility]

## 2018-08-29 NOTE — PATIENT INSTRUCTIONS
Thanks for coming into [facility] today.    We discussed: I'm glad things are going well!  Enjoy your shows this fall!    Medication changes:  Continue your current medication.      Follow up: as scheduled with Dr. Nieto.  When you get insurance, please see Shelley Lange PA-C and Dr. Jackson.        Please call my nurse at [phone] with any questions or concerns.    Scheduling phone number: [phone]  Reminder: Please bring in all current medications, over the counter supplements and vitamin bottles to your next appointment.

## 2018-08-30 NOTE — ADDENDUM NOTE
Encounter addended by: Jennifer Granados on: 8/30/2018  9:19 AM<BR>     Actions taken: Sign clinical note, Episode resolved

## 2018-08-30 NOTE — PROGRESS NOTES
Cardiac Rehab Discharge Summary    Reason for discharge:    Patient/family request discontinuation of services.    Progress towards goals:  Goals not met.  Barriers to achieving goals:   discharge on same date as initial evaluation. Due to insurance complications    Recommendation(s):    Continue home exercise program.

## 2018-08-30 NOTE — PROGRESS NOTES
Service Date: 08/29/2018      PRIMARY CARDIOLOGIST:  Dr. Nieto.      REASON FOR VISIT:  STEMI followup.      HISTORY OF PRESENT ILLNESS:  Bola is a delightful 63-year-old woman who has a past medical history significant for PE while on estrogen, male to female transgender, dyslipidemia that had been managed with diet and exercise when she presented with acute chest pain.  She was found to have an inferior STEMI and went emergently for coronary angiogram.  This demonstrated 100% thrombotic OM4 that was in the distal part of the vessel.  As the vessel was very small and distal, it was managed medically.  She otherwise had a normal left main, mid LAD with a 30% lesion, D2 with an ostial 50% lesion, RCA with minimal disease, and a circ with 50% ostial lesions.  Her troponins peaked at 26.  Her echocardiogram came back with a low normal EF and moderate apical wall hypokinesis and no valvular disease.  During her stay, she had a brief run of atrial fibrillation, about 14 seconds that was asymptomatic.  Her hemoglobin was noted to be mildly elevated.  When I last saw her in clinic, she was feeling well.  We had planned on doing a Holter monitor to assess possible AFib burden.      She tells me she went to get her monitor put on and she still did not have insurance set up.  They were unable to tell her a cost, so she declined it.  She is still awaiting the results of whether or not she will be insured for both her hospitalization and going forward.  Outside of this tiny issue, she feels great.  She has no chest pain, shortness of breath, orthopnea or PND.  She has not had syncope or presyncope.  She has not had palpitations.  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  She has cut back and now typically only works evenings on the weekends, where previously she worked all day every day of the week and then evenings as  well.  She is a lifelong nonsmoker, has never used drugs and about 1 alcoholic drink a week.      PHYSICAL EXAMINATION:   GENERAL:  A well-developed, well-nourished woman in no acute distress.   HEENT:  Normocephalic, atraumatic.   HEART:  Regular in rate and rhythm.  I do not appreciate murmur, rub or gallop.  Carotids are quiet.   LUNGS:  Clear without wheezes, rales or rhonchi.   EXTREMITIES:  Without peripheral edema.   SKIN:  Warm and dry.      ASSESSMENT AND PLAN:   1.  Coronary artery disease with inferior STEMI with an occluded OM4, managed medically given it was a small distal vessel.  Her EF is low normal around 50%-55%.  She is appropriately on aspirin and Plavix and will be on this for 1 year.  Fortunately, she has not had any recurrent symptoms and is able to go back to other activity.   2.  Elevated hemoglobin with me discussing the concern for polycythemia vera.  Again, she is deferring workup again until her insurance is figured out, which I think is reasonable.  It was only as high as 16.8 and not markedly elevated, I do not think something needs to be done immediately, but don't want it to fall through the cracks.   3.  Dyslipidemia, started on Lipitor 80, we will repeat lipid panel prior to visit with Dr. Nieto.   4.  History of PE while on estrogen.  She has been off estrogen since that time and will remain off.   5.  History of brief run of atrial tachycardia in the hospital.  Would still recommend a 24-hour Holter monitor to assess for AFib, as we certainly want to anticoagulate appropriately, she is postponing this due to financial concerns which I think is understandable.  Hopefully, those will be sorted out in the next few weeks and it can be reassessed.      We will have her follow up with Dr. Nieto in about 3 months with a lipid panel and BMP at that time, sooner with concerns.  I have encouraged her to reestablish care with her primary, Shelley Lange PA-C, as well as Dr. Jackson, who is her  hormonal doctor.  I will be more than pleased to share care with her.      cc:   Missy Rankin MD   [facility] [address] [place]  [address]      ALISA Mata   [facility] [address] [place] [address]         ANDREW WARD PA-C             D: 2018   T: 2018   MT: VIRAL      Name:     KANNAN BELLO   MRN:      [id number]        Account:      [id number]   :      1955           Service Date: 2018      Document: [id number]

## 2022-01-09 ENCOUNTER — HOSPITAL ENCOUNTER (EMERGENCY)
Facility: CLINIC | Age: 67
Discharge: HOME | End: 2022-01-09
Attending: PHYSICIAN ASSISTANT | Admitting: PHYSICIAN ASSISTANT
Payer: COMMERCIAL

## 2022-01-09 VITALS
OXYGEN SATURATION: 97 % | DIASTOLIC BLOOD PRESSURE: 95 MMHG | RESPIRATION RATE: 18 BRPM | SYSTOLIC BLOOD PRESSURE: 178 MMHG | TEMPERATURE: 98 F | HEART RATE: 75 BPM

## 2022-01-09 DIAGNOSIS — S63.276A CLOSED DISLOCATION OF INTERPHALANGEAL JOINT OF RIGHT LITTLE FINGER: ICD-10-CM

## 2022-01-09 PROCEDURE — 99284 EMERGENCY DEPT VISIT MOD MDM: CPT | Mod: 25

## 2022-01-09 PROCEDURE — 26770 TREAT FINGER DISLOCATION: CPT | Mod: F9

## 2022-01-09 ASSESSMENT — ENCOUNTER SYMPTOMS
ARTHRALGIAS: 1
NUMBNESS: 1

## 2022-01-10 NOTE — ED PROVIDER NOTES
Emergency Department Attending Supervision Note  1/9/2022  7:08 PM      I evaluated this patient in conjunction with Carmela Ordoñez PA-C      Briefly, the patient presented with finger pain after jamming 5th finger on the counter just PTA.  Feels dislocated.  No numbness      Exam:  right Upper Ext:  MSK:   No tenderness of the wrist or elbow with full AROM  Injury to digit 5.  Tender to palpation over the PIP with joint deformity, no laceration.      After reduction 5/5 flexion at MCP, PIP and DIP joints.  5/5 extension of MCP, PIP and DIP joints.  No laxity with lateral stress to the MCP, PIP and DIP.  CV: brisk capillary refill in 5th digit  Neuro: sensation intact touch 5th  digit, strength exam above.  Skin: intact, no surrounding redness      Procedure:  Finger dislocation reduction- the joint was distracted and the fracture was reduced.  Full AROM finger following reduction    MDM and Plan:  Finger dislocation- reduced.  Plan for x-ray- pt declined.        Diagnosis    ICD-10-CM    1. Closed dislocation of interphalangeal joint of right little finger  S63.276A               Miley Sousa MD  01/10/22 0013

## 2022-01-10 NOTE — ED NOTES
History     Chief Complaint:  Hand Injury       HPI   Ronak Malave is a 66 year old adult who presents with right pinky pain.  Patient reports trying to grab a cup that was falling and struck the right pinky into the counter.  Unable to range the right fifth PIP joints. Reports some tingling to the tip of the finger.     Allergies:  No Known Allergies     Medications:    aspirin 81 MG EC tablet  atorvastatin (LIPITOR) 80 MG tablet  carvedilol (COREG) 3.125 MG tablet  clopidogrel (PLAVIX) 75 MG tablet  FINASTERIDE PO  glucosamine-chondroitin 500-400 MG CAPS  Multiple Vitamins-Minerals (HAIR SKIN AND NAILS FORMULA PO)  nitroGLYcerin (NITROSTAT) 0.4 MG sublingual tablet  SPIRONOLACTONE PO      Past Medical History:    No past medical history on file.    Patient Active Problem List    Diagnosis Date Noted     Hyperlipidemia LDL goal <70 07/17/2018     Priority: Medium     Benign essential HTN 07/17/2018     Priority: Medium     STEMI (ST elevation myocardial infarction) (H) 07/16/2018     Priority: Medium     Pulmonary embolism (H) 01/22/2014     Priority: Medium        Past Surgical History:    Past Surgical History:   Procedure Laterality Date     BREAST SURGERY       ORTHOPEDIC SURGERY          Family History:    family history is not on file.    Social History:  PCP: Clinic, [facility]     Review of Systems   Musculoskeletal: Positive for arthralgias (right pinky).   Neurological: Positive for numbness (tingling).       Physical Exam     Patient Vitals for the past 24 hrs:   BP Temp Temp src Pulse Resp SpO2   01/09/22 1842 (!) 178/95 98  F (36.7  C) Temporal 75 18 97 %        Physical Exam  Constitutional: alert, cooperative   CV: 2+ radial pulse, brisk distal cap refill  Pulm: No respiratory distress  MSK: unable to range right 5th PIP joint with associated mild tednerness. Remainder of digit non-tender. No open wound.   Neurological: Alert, attentive. Reports change in sensation to the distal  right 5th finger.   Skin: Skin is warm and dry.   Psych: Normal mood and affect      Emergency Department Course     Imaging:    XR Finger Right G/E 2 Views    (Results Pending)   -patient declined      Emergency Department Course:  Past medical records, nursing notes, and vitals reviewed.  I performed an exam of the patient and obtained history, as documented above.    I rechecked the patient. Findings and plan explained to the Patient. Patient was discharged.    Impression & Plan      Medical Decision Making:  Ronak Malave is a 66 year old adult presents to the emergency department with a right fifth finger dislocation.  The right fifth PIP joint was easily reduced by Dr. Sousa.  After reduction, the patient had full range of motion and CMS intact. We discussed plan to proceed with post reduction x-ray, though the patient would like to defer imaging as there is full ROM and no significant pain.  I explained that we may be missing a small avulsion fracture. Patient agrees with plan all questions and concerns addressed prior to discharge home.    Diagnosis:    ICD-10-CM    1. Closed dislocation of interphalangeal joint of right little finger  S63.276A         Discharge Medications:     Medication List      There are no discharge medications for this visit.          1/9/2022   Carmela Rhodes PA-C, PA-C  01/09/22 1931

## 2023-02-18 ENCOUNTER — OFFICE VISIT (OUTPATIENT)
Dept: URGENT CARE | Facility: URGENT CARE | Age: 68
End: 2023-02-18
Payer: COMMERCIAL

## 2023-02-18 VITALS
SYSTOLIC BLOOD PRESSURE: 138 MMHG | RESPIRATION RATE: 18 BRPM | HEART RATE: 77 BPM | BODY MASS INDEX: 23.29 KG/M2 | DIASTOLIC BLOOD PRESSURE: 86 MMHG | TEMPERATURE: 97.9 F | OXYGEN SATURATION: 98 % | WEIGHT: 181.4 LBS

## 2023-02-18 DIAGNOSIS — H61.22 IMPACTED CERUMEN OF LEFT EAR: Primary | ICD-10-CM

## 2023-02-18 PROCEDURE — 69209 REMOVE IMPACTED EAR WAX UNI: CPT | Mod: LT | Performed by: PHYSICIAN ASSISTANT

## 2023-02-18 RX ORDER — BIOTIN 10000 MCG
CAPSULE ORAL
COMMUNITY

## 2023-02-18 NOTE — PATIENT INSTRUCTIONS
Patient was educated on the natural  course of the condition. Conservative measures to prevent ear wax build up including applying a few drops of mineral oil or earwax softener (Debrox) were discussed.  Avoid using q tips as this may push ear wax further in to the ear canal.  See your primary care provider if symptoms worsen or do not improve in 7 days. Seek emergency care if you develop severe ear pain or fever over 103.

## 2023-02-18 NOTE — PROGRESS NOTES
URGENT CARE VISIT:    SUBJECTIVE:   Ronak Malave is a 67 year old adult presenting with a chief complaint of left  decreased hearing.  Onset was 1 week(s) ago.   She denies the following symptoms: ear pain, fever, chills and stuffy nose  Course of illness is same.    Treatment measures tried include OTC ear drops with no relief of symptoms.  Predisposing factors include none.    PMH: History reviewed. No pertinent past medical history.  Allergies: Patient has no known allergies.   Medications:   Current Outpatient Medications   Medication Sig Dispense Refill     aspirin 81 MG EC tablet Take 1 tablet (81 mg) by mouth daily 100 tablet 3     Biotin 10 MG CAPS        glucosamine-chondroitin 500-400 MG CAPS Take 1 capsule by mouth daily       Multiple Vitamins-Minerals (HAIR SKIN AND NAILS FORMULA PO) Take 2 capsules by mouth daily       nitroGLYcerin (NITROSTAT) 0.4 MG sublingual tablet For chest pain place 1 tablet under the tongue every 5 minutes for 3 doses. If symptoms persist 5 minutes after 1st dose call 911. 25 tablet 3     atorvastatin (LIPITOR) 80 MG tablet Take 1 tablet (80 mg) by mouth every evening (Patient not taking: Reported on 2/18/2023) 90 tablet 3     carvedilol (COREG) 3.125 MG tablet Take 1 tablet (3.125 mg) by mouth 2 times daily (with meals) (Patient not taking: Reported on 2/18/2023) 180 tablet 3     clopidogrel (PLAVIX) 75 MG tablet Take 1 tablet (75 mg) by mouth daily (Patient not taking: Reported on 2/18/2023) 90 tablet 3     FINASTERIDE PO Take 5 mg by mouth daily  (Patient not taking: Reported on 2/18/2023)       SPIRONOLACTONE PO Take 50 mg by mouth 2 times daily (Patient not taking: Reported on 2/18/2023)       Social History:   Social History     Tobacco Use     Smoking status: Never     Smokeless tobacco: Never   Substance Use Topics     Alcohol use: Yes     Alcohol/week: 1.0 standard drink     Types: 1 Glasses of wine per week       ROS:  Review of systems negative except as stated  above.    OBJECTIVE:  /86 (BP Location: Right arm, Patient Position: Sitting, Cuff Size: Adult Large)   Pulse 77   Temp 97.9  F (36.6  C) (Oral)   Resp 18   Wt 82.3 kg (181 lb 6.4 oz)   SpO2 98%   BMI 23.29 kg/m    GENERAL APPEARANCE: healthy, alert and no distress  EYES: conjunctiva clear  HENT: TM's normal.  Copious cerumen present in left external ear canal.   NECK: supple, nontender, no lymphadenopathy  RESP: lungs clear to auscultation - no rales, rhonchi or wheezes  CV: regular rates and rhythm, normal S1 S2, no murmur noted  SKIN: no suspicious lesions or rashes    ASSESSMENT:    ICD-10-CM    1. Impacted cerumen of left ear  H61.22 MT REMOVAL IMPACTED CERUMEN IRRIGATION/LVG UNILAT          PROCEDURE: Ear irrigation  Ear wax was successfully removed with irrigation. Patient tolerated the procedure well.     PLAN:  Patient Instructions   Patient was educated on the natural  course of the condition. Conservative measures to prevent ear wax build up including applying a few drops of mineral oil or earwax softener (Debrox) were discussed.  Avoid using q tips as this may push ear wax further in to the ear canal.  See your primary care provider if symptoms worsen or do not improve in 7 days. Seek emergency care if you develop severe ear pain or fever over 103.    Patient verbalized understanding and is agreeable to plan. The patient was discharged ambulatory and in stable condition.    Beckie Guadalupe PA-C on 2/18/2023 at 3:33 PM

## (undated) RX ORDER — HEPARIN SODIUM 1000 [USP'U]/ML
INJECTION, SOLUTION INTRAVENOUS; SUBCUTANEOUS
Status: DISPENSED
Start: 2018-07-16

## (undated) RX ORDER — FENTANYL CITRATE 50 UG/ML
INJECTION, SOLUTION INTRAMUSCULAR; INTRAVENOUS
Status: DISPENSED
Start: 2018-07-16

## (undated) RX ORDER — VERAPAMIL HYDROCHLORIDE 2.5 MG/ML
INJECTION, SOLUTION INTRAVENOUS
Status: DISPENSED
Start: 2018-07-16